# Patient Record
Sex: MALE | Race: WHITE | NOT HISPANIC OR LATINO | URBAN - METROPOLITAN AREA
[De-identification: names, ages, dates, MRNs, and addresses within clinical notes are randomized per-mention and may not be internally consistent; named-entity substitution may affect disease eponyms.]

---

## 2020-06-24 ENCOUNTER — APPOINTMENT (RX ONLY)
Dept: URBAN - METROPOLITAN AREA OTHER 9 | Facility: OTHER | Age: 60
Setting detail: DERMATOLOGY
End: 2020-06-24

## 2020-06-24 DIAGNOSIS — L91.8 OTHER HYPERTROPHIC DISORDERS OF THE SKIN: ICD-10-CM

## 2020-06-24 DIAGNOSIS — L82.1 OTHER SEBORRHEIC KERATOSIS: ICD-10-CM

## 2020-06-24 DIAGNOSIS — D22 MELANOCYTIC NEVI: ICD-10-CM

## 2020-06-24 DIAGNOSIS — L81.4 OTHER MELANIN HYPERPIGMENTATION: ICD-10-CM

## 2020-06-24 DIAGNOSIS — L44.8 OTHER SPECIFIED PAPULOSQUAMOUS DISORDERS: ICD-10-CM

## 2020-06-24 PROBLEM — D22.4 MELANOCYTIC NEVI OF SCALP AND NECK: Status: ACTIVE | Noted: 2020-06-24

## 2020-06-24 PROCEDURE — ? OTHER

## 2020-06-24 PROCEDURE — ? COUNSELING

## 2020-06-24 PROCEDURE — 99202 OFFICE O/P NEW SF 15 MIN: CPT

## 2020-06-24 ASSESSMENT — LOCATION DETAILED DESCRIPTION DERM
LOCATION DETAILED: RIGHT INFERIOR FOREHEAD
LOCATION DETAILED: RIGHT AXILLARY VAULT
LOCATION DETAILED: RIGHT CENTRAL TEMPLE
LOCATION DETAILED: LEFT FOREHEAD
LOCATION DETAILED: INFERIOR THORACIC SPINE
LOCATION DETAILED: RIGHT MEDIAL MALAR CHEEK
LOCATION DETAILED: RIGHT SUPERIOR CENTRAL MALAR CHEEK
LOCATION DETAILED: LEFT INFERIOR UPPER BACK
LOCATION DETAILED: NASAL DORSUM
LOCATION DETAILED: RIGHT LATERAL SUPERIOR CHEST
LOCATION DETAILED: RIGHT CENTRAL LATERAL NECK
LOCATION DETAILED: RIGHT SUPERIOR LATERAL MALAR CHEEK

## 2020-06-24 ASSESSMENT — LOCATION SIMPLE DESCRIPTION DERM
LOCATION SIMPLE: NECK
LOCATION SIMPLE: RIGHT TEMPLE
LOCATION SIMPLE: RIGHT FOREHEAD
LOCATION SIMPLE: UPPER BACK
LOCATION SIMPLE: LEFT FOREHEAD
LOCATION SIMPLE: LEFT UPPER BACK
LOCATION SIMPLE: RIGHT AXILLARY VAULT
LOCATION SIMPLE: RIGHT CHEEK
LOCATION SIMPLE: CHEST
LOCATION SIMPLE: NOSE

## 2020-06-24 ASSESSMENT — LOCATION ZONE DERM
LOCATION ZONE: AXILLAE
LOCATION ZONE: FACE
LOCATION ZONE: NECK
LOCATION ZONE: TRUNK
LOCATION ZONE: NOSE

## 2020-06-24 NOTE — PROCEDURE: OTHER
Note Text (......Xxx Chief Complaint.): This diagnosis correlates with the
Detail Level: Detailed
Other (Free Text): Patient informed that SKs can be treated with LN2 if patient desires but is not necessary as they’re benign growth. Patient declined LN2 treatment today.

## 2021-06-29 ENCOUNTER — APPOINTMENT (RX ONLY)
Dept: URBAN - METROPOLITAN AREA OTHER 9 | Facility: OTHER | Age: 61
Setting detail: DERMATOLOGY
End: 2021-06-29

## 2021-06-29 DIAGNOSIS — L91.8 OTHER HYPERTROPHIC DISORDERS OF THE SKIN: ICD-10-CM

## 2021-06-29 DIAGNOSIS — L82.1 OTHER SEBORRHEIC KERATOSIS: ICD-10-CM

## 2021-06-29 DIAGNOSIS — L81.4 OTHER MELANIN HYPERPIGMENTATION: ICD-10-CM

## 2021-06-29 DIAGNOSIS — L90.8 OTHER ATROPHIC DISORDERS OF SKIN: ICD-10-CM

## 2021-06-29 DIAGNOSIS — L57.8 OTHER SKIN CHANGES DUE TO CHRONIC EXPOSURE TO NONIONIZING RADIATION: ICD-10-CM

## 2021-06-29 DIAGNOSIS — D22 MELANOCYTIC NEVI: ICD-10-CM

## 2021-06-29 DIAGNOSIS — L44.8 OTHER SPECIFIED PAPULOSQUAMOUS DISORDERS: ICD-10-CM

## 2021-06-29 DIAGNOSIS — Z71.89 OTHER SPECIFIED COUNSELING: ICD-10-CM

## 2021-06-29 DIAGNOSIS — D18.0 HEMANGIOMA: ICD-10-CM

## 2021-06-29 PROBLEM — D22.5 MELANOCYTIC NEVI OF TRUNK: Status: ACTIVE | Noted: 2021-06-29

## 2021-06-29 PROBLEM — D18.01 HEMANGIOMA OF SKIN AND SUBCUTANEOUS TISSUE: Status: ACTIVE | Noted: 2021-06-29

## 2021-06-29 PROBLEM — D22.39 MELANOCYTIC NEVI OF OTHER PARTS OF FACE: Status: ACTIVE | Noted: 2021-06-29

## 2021-06-29 PROCEDURE — ? DEFER

## 2021-06-29 PROCEDURE — ? OTHER

## 2021-06-29 PROCEDURE — ? COUNSELING

## 2021-06-29 PROCEDURE — 99213 OFFICE O/P EST LOW 20 MIN: CPT

## 2021-06-29 ASSESSMENT — LOCATION DETAILED DESCRIPTION DERM
LOCATION DETAILED: LEFT SUPERIOR CENTRAL MALAR CHEEK
LOCATION DETAILED: STERNUM
LOCATION DETAILED: RIGHT INFERIOR MEDIAL UPPER BACK
LOCATION DETAILED: RIGHT SUPERIOR CENTRAL MALAR CHEEK
LOCATION DETAILED: RIGHT AXILLARY VAULT
LOCATION DETAILED: RIGHT MEDIAL MALAR CHEEK
LOCATION DETAILED: LEFT AXILLARY VAULT
LOCATION DETAILED: LEFT INFERIOR CENTRAL MALAR CHEEK
LOCATION DETAILED: RIGHT CENTRAL FRONTAL SCALP
LOCATION DETAILED: INFERIOR THORACIC SPINE
LOCATION DETAILED: RIGHT SUPERIOR MEDIAL FOREHEAD
LOCATION DETAILED: RIGHT CENTRAL FRONTAL SCALP
LOCATION DETAILED: RIGHT MEDIAL SUPERIOR CHEST
LOCATION DETAILED: PERIUMBILICAL SKIN

## 2021-06-29 ASSESSMENT — LOCATION SIMPLE DESCRIPTION DERM
LOCATION SIMPLE: RIGHT SCALP
LOCATION SIMPLE: RIGHT SCALP
LOCATION SIMPLE: RIGHT AXILLARY VAULT
LOCATION SIMPLE: RIGHT UPPER BACK
LOCATION SIMPLE: CHEST
LOCATION SIMPLE: RIGHT CHEEK
LOCATION SIMPLE: LEFT AXILLARY VAULT
LOCATION SIMPLE: UPPER BACK
LOCATION SIMPLE: LEFT CHEEK
LOCATION SIMPLE: ABDOMEN
LOCATION SIMPLE: RIGHT FOREHEAD

## 2021-06-29 ASSESSMENT — LOCATION ZONE DERM
LOCATION ZONE: AXILLAE
LOCATION ZONE: SCALP
LOCATION ZONE: SCALP
LOCATION ZONE: FACE
LOCATION ZONE: TRUNK

## 2021-06-29 NOTE — PROCEDURE: OTHER
Other (Free Text): Referred patient to  for consultation.
Detail Level: Detailed
Render Risk Assessment In Note?: no
Note Text (......Xxx Chief Complaint.): This diagnosis correlates with the

## 2022-01-07 ENCOUNTER — OFFICE VISIT (OUTPATIENT)
Dept: URBAN - METROPOLITAN AREA CLINIC 42 | Facility: CLINIC | Age: 62
End: 2022-01-07

## 2022-04-19 ENCOUNTER — OFFICE VISIT (OUTPATIENT)
Dept: URBAN - METROPOLITAN AREA TELEHEALTH 2 | Facility: TELEHEALTH | Age: 62
End: 2022-04-19

## 2022-04-23 ENCOUNTER — TELEPHONE ENCOUNTER (OUTPATIENT)
Dept: URBAN - METROPOLITAN AREA CLINIC 92 | Facility: CLINIC | Age: 62
End: 2022-04-23

## 2022-04-23 NOTE — HPI-TODAY'S VISIT:
Patient is a 61-year-old male being referred for evaluation of hepatitis B issues. A copy of the note will be sent to the referring provider. Patient previously had seen Dr. Baez here back in 2016. Recently had been seen by Dr. Ozzie Scott of digestive [facility]. Please see a note here from November 6, 2018 that mentions that the patient has chronic hep B and postprandial belching and bloating.  Ultrasound ordered for liver screening was not done.  EGD and colon was also not done. Patient apparently had issues getting the ultrasound done at [facility]. He was reported to have low viral load and liver enzymes that were negative and E antibody positive state.  They stressed the importance of screening. Weight at that time was 165 pounds height of 5 foot 10 BMI 23.68.  December 7, 2016 labs show white blood cell count 7.5 hemoglobin 14.2 glucose 104 BUN of 16 creatinine 1.18 albumin 4.5 AST 26 ALT 15 alkaline phosphatase 55 bilirubin 0.6. July 22, 2015 colonoscopy showed 4 polyps in the right colon ranging from 3 to 5 mm removed with hot snare and 2 polyps in the transverse colon ranging from 2 to 3 mm removed with cold snare and sigmoid diverticulosis and internal hemorrhoids. Pathology from the right colon polypectomy show fragments of tubular adenoma and fragments of hyperplastic polyp and submucosal lipoma transverse colon polyp was hyperplastic and tubular adenoma. May 2016 B surface antigen positive E antibody positive viral load 8 await. January 2017 labs showed AFP elevated 15.3 viral load 480 AST 31. March 2017 ultrasound mentions pancreas poorly seen no definite abnormality liver was remarkable for a mildly complicated 1.5 x 1.1 x 1.2 cm cyst in the inferior aspect and no gallstones. October 2017 MRI showed small cyst in the posterior segment the right liver otherwise unremarkable. They recommended the patient to have tumor screening and that he could potentially do alternating ultrasound with MRI. They wanted to see him in 6 months. Do not see any notes from that timeframe but do see notes from earlier in April 2018 where again he was being seen for the hepatitis B and labs from September 2017 showed AST 27 ALT 16 total bili 0.8 in May 2016 labs reference viral load 8 await. December 2016 labs show white count 7.5 hemoglobin 14.2 creatinine 1.0 AST 31 ALT 21 Past medical history chronic hep B, colon polyps, back pain. Past surgeries include colonoscopy 2015, nasal septal surgery, fractured left wrist. Social history not a smoker but occasional social alcohol. Father with prostate cancer and kidney stones.  Mother with thyroid disease. We did see a hep B panel (from November 6, 2018 a level of 300 hep B April 2018 for 50 September 2017 230 ALT over the same timeframe have been 16, 15, and 16. We did confirm that January 9, 2017 AFP that was elevated at 15.3 and normal was less than 8.3. We saw an EGD / colon from October 2019 showed a small hiatal hernia mild gastritis and a 5 mm ascending colon polyp and diverticulosis.  Duodenum showed no significant histologic changes stomach showed no H. pylori and ascending colon showed vegetable matter. Assessment: 1.	Chronic hepatitis B not felt to be meeting criteria previously has less than 2000 and ALT and less then 35 and ALT but with elevated AFP seen in the past.  Needs to have updated labs done to check on this as well as updated imaging.  Stressed the importance of every 6-month surveillance at a minimum and more often if AFP is elevated.  Patient needs to get labs as well as imaging and may need to do more advanced imaging pending above. 2.	History of liver cyst noted and needs follow-up imaging.  Overall important to maintain imaging for hepatitis  people can go on to cirrhosis or cancer but in addition he had an elevated alpha-fetoprotein. 3.	Elevated AFP noted and needs to be followed up on.  Unclear if related to inflammation or other issues and needs to be confirmed if elevated should be on at least an every 4-month imaging protocol.  May need MRI pending ultrasound. 4.	History of colon polyps noted in last colonoscopy 2019.  Previously saw Dr. Ojeda for same here and Dr Scott locally. 5.	Elevated tumor markers noted and needs to be followed up on.. Plan 1.  _update labs.   2.  _update AFP 3.  _update imaging. 4.  Reassess status.   Stressed to pt the need for social distancing and strict handwashing and wearing a mask and to follow any other new or added CDC recommendations as this is an evolving target. Duration of the visit was 0 minutes with 20 minutes of chart prep and 20 minutes by dox video and then by phone due to internet issues by phone for this TeleMed visit with time reviewing their prior and recent records and labs and discussing their current status and future plans for care for the patient.

## 2022-04-25 ENCOUNTER — LAB OUTSIDE AN ENCOUNTER (OUTPATIENT)
Dept: URBAN - METROPOLITAN AREA CLINIC 86 | Facility: CLINIC | Age: 62
End: 2022-04-25

## 2022-04-25 ENCOUNTER — WEB ENCOUNTER (OUTPATIENT)
Dept: URBAN - METROPOLITAN AREA CLINIC 86 | Facility: CLINIC | Age: 62
End: 2022-04-25

## 2022-04-25 ENCOUNTER — OFFICE VISIT (OUTPATIENT)
Dept: URBAN - METROPOLITAN AREA CLINIC 86 | Facility: CLINIC | Age: 62
End: 2022-04-25
Payer: COMMERCIAL

## 2022-04-25 VITALS
BODY MASS INDEX: 22.4 KG/M2 | DIASTOLIC BLOOD PRESSURE: 70 MMHG | SYSTOLIC BLOOD PRESSURE: 105 MMHG | HEART RATE: 68 BPM | TEMPERATURE: 97.9 F | HEIGHT: 71 IN | WEIGHT: 160 LBS

## 2022-04-25 DIAGNOSIS — R77.2 ELEVATED AFP: ICD-10-CM

## 2022-04-25 DIAGNOSIS — K44.9 HIATAL HERNIA: ICD-10-CM

## 2022-04-25 DIAGNOSIS — K76.89 LIVER CYST: ICD-10-CM

## 2022-04-25 DIAGNOSIS — B18.1 CHRONIC HEPATITIS B: ICD-10-CM

## 2022-04-25 DIAGNOSIS — K63.5 COLON POLYP: ICD-10-CM

## 2022-04-25 DIAGNOSIS — R89.8 ELEVATED TUMOR MARKERS: ICD-10-CM

## 2022-04-25 PROBLEM — 84089009 HIATAL HERNIA: Status: ACTIVE | Noted: 2022-04-23

## 2022-04-25 PROBLEM — 68496003 POLYP COLON: Status: ACTIVE | Noted: 2022-04-23

## 2022-04-25 PROCEDURE — 99204 OFFICE O/P NEW MOD 45 MIN: CPT

## 2022-04-25 PROCEDURE — 99244 OFF/OP CNSLTJ NEW/EST MOD 40: CPT

## 2022-04-25 NOTE — EXAM-PHYSICAL EXAM
Gen: awake and responsive. Eyes: anicteric, normal lids. Mouth: covered with mask. Nose: covered with mask. Hearing: intact grossly. Neck: trachea midline and no jvd. CV: RRR no s3. Lungs: clear. No wheezes, Abd: Soft, nabs, Nr, NT. No hsm. Ext: no sig edema, some  palm erythema. Neuro: moves all 4 ext grossly. No asterixis. Skin: no pruritis and some palm erythema.

## 2022-04-25 NOTE — HPI-TODAY'S VISIT:
Patient is a 61-year-old male being referred for evaluation of hepatitis B issues.  A copy of the note will be sent to the referring provider.  Records review: Patient previously had seen Dr. Baez here back in 2016. Not much of that info.  Recently had been seen by Dr. Ozzie Scott of [facility].  Please see a note here from November 6, 2018 that mentions that the patient has chronic hep B and postprandial belching and bloating.  Ultrasound ordered for liver screening was not done.  EGD and colon was also not done.  Patient apparently had issues getting the ultrasound done at [facility].  He was reported to have low viral load and liver enzymes that were negative and E antibody positive state.    They stressed the importance of screening.  Same 165 still.  Weight at that time was 165 pounds height of 5 foot 10 BMI 23.68.    December 7, 2016 labs show white blood cell count 7.5 hemoglobin 14.2 glucose 104 BUN of 16 creatinine 1.18 albumin 4.5 AST 26 ALT 15 alkaline phosphatase 55 bilirubin 0.6.  July 22, 2015 colonoscopy showed 4 polyps in the right colon ranging from 3 to 5 mm removed with hot snare and 2 polyps in the transverse colon ranging from 2 to 3 mm removed with cold snare and sigmoid diverticulosis and internal hemorrhoids.  Last colon 2019 and only one polyp then.  Pathology from the right colon polypectomy show fragments of tubular adenoma and fragments of hyperplastic polyp and submucosal lipoma transverse colon polyp was hyperplastic and tubular adenoma.  May 2016 B surface antigen positive E antibody positive viral load 8 await.  January 2017 labs showed AFP elevated 15.3 viral load 480 AST 31.  March 2017 ultrasound mentions pancreas poorly seen no definite abnormality liver was remarkable for a mildly complicated 1.5 x 1.1 x 1.2 cm cyst in the inferior aspect and no gallstones.  October 2017 MRI showed small cyst in the posterior segment the right liver otherwise unremarkable. They recommended the patient to have tumor screening and that he could potentially do alternating ultrasound with MRI.  They wanted to see him in 6 months.  He has not done any more since then.  Pt says he went to donate blood and he is from [place].  He says had letter then. He saw specialist then and he was 17 and told not to worry.  He was seeing Dr Baez and Dr Scott for gi issues and then mentioned this and led to the work up.  Do not see any notes from that timeframe but do see notes from earlier in April 2018 where again he was being seen for the hepatitis B and labs from September 2017 showed AST 27 ALT 16 total bili 0.8 in May 2016 labs reference viral load 8 await.  December 2016 labs show white count 7.5 hemoglobin 14.2 creatinine 1.0 AST 31 ALT 21  Past medical history chronic hep B, colon polyps, back pain.  Past surgeries include colonoscopy 2015, nasal septal surgery, fractured left wrist.  Social history not a smoker but occasional social alcohol.  Father with prostate cancer and kidney stones.  Mother with thyroid disease.  We did see a hep B panel (from November 6, 2018 a level of 300 hep B April 2018 for 50 September 2017 230 ALT over the same timeframe have been 16, 15, and 16.  We did confirm that January 9, 2017 AFP that was elevated at 15.3 and normal was less than 8.3.  We saw an EGD / colon from October 2019 showed a small hiatal hernia mild gastritis and a 5 mm ascending colon polyp and diverticulosis.  Duodenum showed no significant histologic changes stomach showed no H. pylori and ascending colon showed vegetable matter.  _update: If afp up needing to be done more often.  Need to caught on labs and u.s. Plan 1.  _update labs.   2.  _update AFP 3.  _update imaging. 4.  Reassess status. 5. RTC in 6m.  Stressed to pt the need for social distancing and strict handwashing and wearing a mask and to follow any other new or added CDC recommendations as this is an evolving target.  Duration of the visit was 50 minutes with 30 minutes of chart prep and 20 minutes for this face to face visit today with time reviewing their prior and recent records and labs and discussing their current status and future plans for care for the patient.

## 2022-04-30 ENCOUNTER — TELEPHONE ENCOUNTER (OUTPATIENT)
Dept: URBAN - METROPOLITAN AREA CLINIC 92 | Facility: CLINIC | Age: 62
End: 2022-04-30

## 2022-04-30 LAB
A/G RATIO: 1.9
AFP, SERUM, TUMOR MARKER: 10.9
ALBUMIN: 4.8
ALKALINE PHOSPHATASE: 55
ALT (SGPT): 17
AST (SGOT): 30
BASO (ABSOLUTE): 0.1
BASOS: 1
BILIRUBIN, TOTAL: 0.8
BUN/CREATININE RATIO: 13
BUN: 14
CALCIUM: 9.4
CARBON DIOXIDE, TOTAL: 23
CHLORIDE: 101
CREATININE: 1.1
EGFR: 77
EOS (ABSOLUTE): 0.1
EOS: 2
GLOBULIN, TOTAL: 2.5
GLUCOSE: 93
HBV LOG10: 1.6
HEMATOCRIT: 42.1
HEMATOLOGY COMMENTS:: (no result)
HEMOGLOBIN: 13.9
HEP A AB, TOTAL: NEGATIVE
HEP BE AB: POSITIVE
HEP BE AG: NEGATIVE
HEPATITIS B QUANTITATION: 40
IMMATURE CELLS: (no result)
IMMATURE GRANS (ABS): 0
IMMATURE GRANULOCYTES: 0
LYMPHS (ABSOLUTE): 2.1
LYMPHS: 35
MCH: 28.7
MCHC: 33
MCV: 87
MONOCYTES(ABSOLUTE): 0.5
MONOCYTES: 8
NEUTROPHILS (ABSOLUTE): 3.2
NEUTROPHILS: 54
NRBC: (no result)
PLATELETS: 284
POTASSIUM: 4.3
PROTEIN, TOTAL: 7.3
RBC: 4.84
RDW: 13
SODIUM: 139
TEST INFORMATION:: (no result)
WBC: 6

## 2022-04-30 NOTE — HPI-TODAY'S VISIT:
Dear Johan White, April 25 labs show hepatitis A immunity is not seen and you should consider getting the hepatitis A vaccine series.  It is a series of 2 doses and can prevent you against getting hepatitis A on top of your current chronic hepatitis issues which would have more risk to cause issues in you versus if you did not have the chronic hepatitis. AFP remains elevated slightly at 10.9 normal being less than 8.4. Hepatitis B E antigen is negative hepatitis B E antibody is positive.  This puts you in a lower replicative state.  Very common to see at your age. Hepatitis B DNA is low at 40.  By itself not meeting criteria for treatment as hbv B dna is less than 2000. Glucose 93 BUN of 14 creatinine 1.10 sodium 139 potassium 4.3 calcium 9.4 albumin 4.8 bilirubin 0.8 alkaline phosphatase 55 AST 30 and ALT 17.  Ideal ALT is less than 35. White blood cell count 6.0 hemoglobin 13.9 platelet count 284 MCV 87 and neutrophils 3.2 lymphocytes 2.1. I would normally recommend with these labs to do the ultrasound imaging and blood work every 4 months and to do a more advanced scan if the ultrasound has any issues noted.  I am still awaiting the ultrasound imaging. Dr. Sen

## 2022-05-03 ENCOUNTER — OFFICE VISIT (OUTPATIENT)
Dept: URBAN - METROPOLITAN AREA CLINIC 91 | Facility: CLINIC | Age: 62
End: 2022-05-03
Payer: COMMERCIAL

## 2022-05-03 DIAGNOSIS — K76.89 HEPATIC CYST: ICD-10-CM

## 2022-05-03 DIAGNOSIS — B19.10 HEP B W/O COMA: ICD-10-CM

## 2022-05-03 PROCEDURE — 93975 VASCULAR STUDY: CPT

## 2022-05-03 PROCEDURE — 76705 ECHO EXAM OF ABDOMEN: CPT

## 2022-05-04 ENCOUNTER — TELEPHONE ENCOUNTER (OUTPATIENT)
Dept: URBAN - METROPOLITAN AREA CLINIC 92 | Facility: CLINIC | Age: 62
End: 2022-05-04

## 2022-05-04 NOTE — HPI-TODAY'S VISIT:
Dear Johan White, May 3 ultrasound shows the right upper quadrant study to show no gallstones in the gallbladder. Common bile duct normal at 4 mm. Liver however appeared to be coarsened in regards to the echotexture to them. A 9 mm right lobe liver cyst was seen. The pancreas was not well seen due to gas. The right kidney measured 11.1 cm. Spleen measured 10.1 cm. Liver vessels were patent. They were concerned about the liver coarsening as to possible underlying fibrosis and/or cirrhosis risk. Typically we would recommend to do an MRI to assess this further. I looked back at notes regarding prior studies for you and when I looked at the last ultrasound in 2017 it did not mention any coarsening but it did mention the 1.5 x 1.1 x 1.2 cm cyst of the liver. The last October 2017 MRI mentioned a cyst but supposedly the liver was otherwise unremarkable. As you recall from your recent lab review hep B DNA remains low at 40 and the AST 30 and ALT of 17 and the platelet count normal. I tried to call you to discuss the results but could not get you. I will send this via portal to you and if you agree to do the mri we will order it to be done now to be sure. We look forward to hearing back from you. Dr Sen

## 2022-05-05 ENCOUNTER — WEB ENCOUNTER (OUTPATIENT)
Dept: URBAN - METROPOLITAN AREA CLINIC 92 | Facility: CLINIC | Age: 62
End: 2022-05-05

## 2022-05-06 ENCOUNTER — LAB OUTSIDE AN ENCOUNTER (OUTPATIENT)
Dept: URBAN - METROPOLITAN AREA CLINIC 92 | Facility: CLINIC | Age: 62
End: 2022-05-06

## 2022-05-20 ENCOUNTER — WEB ENCOUNTER (OUTPATIENT)
Dept: URBAN - METROPOLITAN AREA CLINIC 92 | Facility: CLINIC | Age: 62
End: 2022-05-20

## 2022-06-06 ENCOUNTER — WEB ENCOUNTER (OUTPATIENT)
Dept: URBAN - METROPOLITAN AREA CLINIC 92 | Facility: CLINIC | Age: 62
End: 2022-06-06

## 2022-06-08 ENCOUNTER — WEB ENCOUNTER (OUTPATIENT)
Dept: URBAN - METROPOLITAN AREA CLINIC 92 | Facility: CLINIC | Age: 62
End: 2022-06-08

## 2022-06-08 ENCOUNTER — TELEPHONE ENCOUNTER (OUTPATIENT)
Dept: URBAN - METROPOLITAN AREA CLINIC 92 | Facility: CLINIC | Age: 62
End: 2022-06-08

## 2022-06-08 NOTE — HPI-TODAY'S VISIT:
Dear Johan White, My office staff was able to get the May 27 AHI report sent in. There was some motion limitations on the scan. Lower chest visualized area showed no significant hilar effusion at the lung bases. The liver was within normal limits for size with the right lobe measuring 16.5 cm. No significant liver fat was seen. There are a few small T2 hyperintense and T1 hypointense hepatic cyst seen measuring 1.4 cm in the posterior right lobe and 0.8 cm near the gallbladder fossa. Several other scattered additional subcentimeter T2 hyperintense foci likely hepatic cysts and/or biliary hamartomas were seen throughout the liver. No suspicious enhancing liver lesions were seen. Liver fat was normal at 5%. The gallbladder was unremarkable. No intra or extrahepatic bile duct dilation was seen. Spleen was normal with regards to size. Adrenal glands were unremarkable. Pancreas was unremarkable. Kidneys showed no hydronephrosis but there is a 1.5 cm cyst seen in the left kidney. Liver vessels were patent. They did see scattered colon diverticuli in the visualized colon but no signs of diverticulitis. Overall they felt that you had no significant liver fat but they did see multiple small hepatic cysts and/or biliary hamartomas.  No suspicious liver lesions were seen. ideally we would redo an u.s in 6m and you prior requested a 1 yr follow up. Please advise if you would like to do the u.s in 6m and be seen then as well. Please call [employer] at [phone] ext [phone]. Thank you. Dr. Sen

## 2022-06-30 ENCOUNTER — APPOINTMENT (RX ONLY)
Dept: URBAN - METROPOLITAN AREA OTHER 9 | Facility: OTHER | Age: 62
Setting detail: DERMATOLOGY
End: 2022-06-30

## 2022-06-30 DIAGNOSIS — D22 MELANOCYTIC NEVI: ICD-10-CM

## 2022-06-30 DIAGNOSIS — L82.1 OTHER SEBORRHEIC KERATOSIS: ICD-10-CM

## 2022-06-30 DIAGNOSIS — D18.0 HEMANGIOMA: ICD-10-CM

## 2022-06-30 DIAGNOSIS — B37.2 CANDIDIASIS OF SKIN AND NAIL: ICD-10-CM

## 2022-06-30 DIAGNOSIS — L81.9 DISORDER OF PIGMENTATION, UNSPECIFIED: ICD-10-CM

## 2022-06-30 DIAGNOSIS — L44.8 OTHER SPECIFIED PAPULOSQUAMOUS DISORDERS: ICD-10-CM

## 2022-06-30 PROBLEM — D22.61 MELANOCYTIC NEVI OF RIGHT UPPER LIMB, INCLUDING SHOULDER: Status: ACTIVE | Noted: 2022-06-30

## 2022-06-30 PROBLEM — D18.01 HEMANGIOMA OF SKIN AND SUBCUTANEOUS TISSUE: Status: ACTIVE | Noted: 2022-06-30

## 2022-06-30 PROCEDURE — ? OTHER

## 2022-06-30 PROCEDURE — ? MEDICATION COUNSELING

## 2022-06-30 PROCEDURE — ? COUNSELING

## 2022-06-30 PROCEDURE — 99213 OFFICE O/P EST LOW 20 MIN: CPT

## 2022-06-30 PROCEDURE — ? PRESCRIPTION MEDICATION MANAGEMENT

## 2022-06-30 PROCEDURE — ? PRESCRIPTION

## 2022-06-30 RX ORDER — KETOCONAZOLE 20 MG/G
CREAM TOPICAL QD
Qty: 60 | Refills: 2 | Status: ERX | COMMUNITY
Start: 2022-06-30

## 2022-06-30 RX ORDER — KETOCONAZOLE 20 MG/ML
SHAMPOO, SUSPENSION TOPICAL
Qty: 120 | Refills: 5 | Status: ERX | COMMUNITY
Start: 2022-06-30

## 2022-06-30 RX ADMIN — KETOCONAZOLE: 20 SHAMPOO, SUSPENSION TOPICAL at 00:00

## 2022-06-30 RX ADMIN — KETOCONAZOLE: 20 CREAM TOPICAL at 00:00

## 2022-06-30 ASSESSMENT — LOCATION SIMPLE DESCRIPTION DERM
LOCATION SIMPLE: RIGHT UPPER BACK
LOCATION SIMPLE: LEFT FOREHEAD
LOCATION SIMPLE: LEFT PRETIBIAL REGION
LOCATION SIMPLE: LEFT UPPER BACK
LOCATION SIMPLE: RIGHT ELBOW
LOCATION SIMPLE: LEFT FOREHEAD
LOCATION SIMPLE: ABDOMEN
LOCATION SIMPLE: LEFT THIGH

## 2022-06-30 ASSESSMENT — LOCATION ZONE DERM
LOCATION ZONE: TRUNK
LOCATION ZONE: ARM
LOCATION ZONE: FACE
LOCATION ZONE: LEG
LOCATION ZONE: FACE

## 2022-06-30 ASSESSMENT — LOCATION DETAILED DESCRIPTION DERM
LOCATION DETAILED: LEFT PROXIMAL PRETIBIAL REGION
LOCATION DETAILED: LEFT SUPERIOR FOREHEAD
LOCATION DETAILED: LEFT MID-UPPER BACK
LOCATION DETAILED: EPIGASTRIC SKIN
LOCATION DETAILED: LEFT SUPERIOR FOREHEAD
LOCATION DETAILED: RIGHT LATERAL UPPER BACK
LOCATION DETAILED: RIGHT ANTECUBITAL SKIN
LOCATION DETAILED: LEFT ANTERIOR PROXIMAL THIGH

## 2022-06-30 NOTE — PROCEDURE: PRESCRIPTION MEDICATION MANAGEMENT
Detail Level: Zone
Initiate Treatment: Ketoconazole cream\\nKetoconazole shampoo
Plan: Apply to affected area QD
Render In Strict Bullet Format?: No

## 2022-06-30 NOTE — PROCEDURE: MEDICATION COUNSELING
Solaraze Counseling:  I discussed with the patient the risks of Solaraze including but not limited to erythema, scaling, itching, weeping, crusting, and pain.
Imiquimod Counseling:  I discussed with the patient the risks of imiquimod including but not limited to erythema, scaling, itching, weeping, crusting, and pain.  Patient understands that the inflammatory response to imiquimod is variable from person to person and was educated regarded proper titration schedule.  If flu-like symptoms develop, patient knows to discontinue the medication and contact us.
Dupixent Counseling: I discussed with the patient the risks of dupilumab including but not limited to eye infection and irritation, cold sores, injection site reactions, worsening of asthma, allergic reactions and increased risk of parasitic infection.  Live vaccines should be avoided while taking dupilumab. Dupilumab will also interact with certain medications such as warfarin and cyclosporine. The patient understands that monitoring is required and they must alert us or the primary physician if symptoms of infection or other concerning signs are noted.
Tetracycline Counseling: Patient counseled regarding possible photosensitivity and increased risk for sunburn.  Patient instructed to avoid sunlight, if possible.  When exposed to sunlight, patients should wear protective clothing, sunglasses, and sunscreen.  The patient was instructed to call the office immediately if the following severe adverse effects occur:  hearing changes, easy bruising/bleeding, severe headache, or vision changes.  The patient verbalized understanding of the proper use and possible adverse effects of tetracycline.  All of the patient's questions and concerns were addressed. Patient understands to avoid pregnancy while on therapy due to potential birth defects.
Doxepin Pregnancy And Lactation Text: This medication is Pregnancy Category C and it isn't known if it is safe during pregnancy. It is also excreted in breast milk and breast feeding isn't recommended.
Gabapentin Counseling: I discussed with the patient the risks of gabapentin including but not limited to dizziness, somnolence, fatigue and ataxia.
Simponi Counseling:  I discussed with the patient the risks of golimumab including but not limited to myelosuppression, immunosuppression, autoimmune hepatitis, demyelinating diseases, lymphoma, and serious infections.  The patient understands that monitoring is required including a PPD at baseline and must alert us or the primary physician if symptoms of infection or other concerning signs are noted.
Benzoyl Peroxide Counseling: Patient counseled that medicine may cause skin irritation and bleach clothing.  In the event of skin irritation, the patient was advised to reduce the amount of the drug applied or use it less frequently.   The patient verbalized understanding of the proper use and possible adverse effects of benzoyl peroxide.  All of the patient's questions and concerns were addressed.
Cellcept Pregnancy And Lactation Text: This medication is Pregnancy Category D and isn't considered safe during pregnancy. It is unknown if this medication is excreted in breast milk.
Cephalexin Counseling: I counseled the patient regarding use of cephalexin as an antibiotic for prophylactic and/or therapeutic purposes. Cephalexin (commonly prescribed under brand name Keflex) is a cephalosporin antibiotic which is active against numerous classes of bacteria, including most skin bacteria. Side effects may include nausea, diarrhea, gastrointestinal upset, rash, hives, yeast infections, and in rare cases, hepatitis, kidney disease, seizures, fever, confusion, neurologic symptoms, and others. Patients with severe allergies to penicillin medications are cautioned that there is about a 10% incidence of cross-reactivity with cephalosporins. When possible, patients with penicillin allergies should use alternatives to cephalosporins for antibiotic therapy.
Dupixent Pregnancy And Lactation Text: This medication likely crosses the placenta but the risk for the fetus is uncertain. This medication is excreted in breast milk.
Solaraze Pregnancy And Lactation Text: This medication is Pregnancy Category B and is considered safe. There is some data to suggest avoiding during the third trimester. It is unknown if this medication is excreted in breast milk.
Hydroxyzine Pregnancy And Lactation Text: This medication is not safe during pregnancy and should not be taken. It is also excreted in breast milk and breast feeding isn't recommended.
Imiquimod Pregnancy And Lactation Text: This medication is Pregnancy Category C. It is unknown if this medication is excreted in breast milk.
Glycopyrrolate Counseling:  I discussed with the patient the risks of glycopyrrolate including but not limited to skin rash, drowsiness, dry mouth, difficulty urinating, and blurred vision.
Propranolol Counseling:  I discussed with the patient the risks of propranolol including but not limited to low heart rate, low blood pressure, low blood sugar, restlessness and increased cold sensitivity. They should call the office if they experience any of these side effects.
Gabapentin Pregnancy And Lactation Text: This medication is Pregnancy Category C and isn't considered safe during pregnancy. It is excreted in breast milk.
Simponi Pregnancy And Lactation Text: The risk during pregnancy and breastfeeding is uncertain with this medication.
Hydroxyzine Counseling: Patient advised that the medication is sedating and not to drive a car after taking this medication.  Patient informed of potential adverse effects including but not limited to dry mouth, urinary retention, and blurry vision.  The patient verbalized understanding of the proper use and possible adverse effects of hydroxyzine.  All of the patient's questions and concerns were addressed.
Oxybutynin Pregnancy And Lactation Text: This medication is Pregnancy Category B and is considered safe during pregnancy. It is unknown if it is excreted in breast milk.
Tetracycline Pregnancy And Lactation Text: This medication is Pregnancy Category D and not consider safe during pregnancy. It is also excreted in breast milk.
Cephalexin Pregnancy And Lactation Text: This medication is Pregnancy Category B and considered safe during pregnancy.  It is also excreted in breast milk but can be used safely for shorter doses.
Opioid Counseling: I discussed with the patient the potential side effects of opioids including but not limited to addiction, altered mental status, and depression. I stressed avoiding alcohol, benzodiazepines, muscle relaxants and sleep aids unless specifically okayed by a physician. The patient verbalized understanding of the proper use and possible adverse effects of opioids. All of the patient's questions and concerns were addressed. They were instructed to flush the remaining pills down the toilet if they did not need them for pain.
Cyclophosphamide Counseling:  I discussed with the patient the risks of cyclophosphamide including but not limited to hair loss, hormonal abnormalities, decreased fertility, abdominal pain, diarrhea, nausea and vomiting, bone marrow suppression and infection. The patient understands that monitoring is required while taking this medication.
Benzoyl Peroxide Pregnancy And Lactation Text: This medication is Pregnancy Category C. It is unknown if benzoyl peroxide is excreted in breast milk.
Klisyri Counseling:  I discussed with the patient the risks of Klisyri including but not limited to erythema, scaling, itching, weeping, crusting, and pain.
Carac Counseling:  I discussed with the patient the risks of Carac including but not limited to erythema, scaling, itching, weeping, crusting, and pain.
Propranolol Pregnancy And Lactation Text: This medication is Pregnancy Category C and it isn't known if it is safe during pregnancy. It is excreted in breast milk.
Cyclosporine Counseling:  I discussed with the patient the risks of cyclosporine including but not limited to hypertension, gingival hyperplasia,myelosuppression, immunosuppression, liver damage, kidney damage, neurotoxicity, lymphoma, and serious infections. The patient understands that monitoring is required including baseline blood pressure, CBC, CMP, lipid panel and uric acid, and then 1-2 times monthly CMP and blood pressure.
Clindamycin Pregnancy And Lactation Text: This medication can be used in pregnancy if certain situations. Clindamycin is also present in breast milk.
Clindamycin Counseling: I counseled the patient regarding use of clindamycin as an antibiotic for prophylactic and/or therapeutic purposes. Clindamycin is active against numerous classes of bacteria, including skin bacteria. Side effects may include nausea, diarrhea, gastrointestinal upset, rash, hives, yeast infections, and in rare cases, colitis.
Cyclophosphamide Pregnancy And Lactation Text: This medication is Pregnancy Category D and it isn't considered safe during pregnancy. This medication is excreted in breast milk.
Topical Retinoid counseling:  Patient advised to apply a pea-sized amount only at bedtime and wait 30 minutes after washing their face before applying.  If too drying, patient may add a non-comedogenic moisturizer. The patient verbalized understanding of the proper use and possible adverse effects of retinoids.  All of the patient's questions and concerns were addressed.
Opioid Pregnancy And Lactation Text: These medications can lead to premature delivery and should be avoided during pregnancy. These medications are also present in breast milk in small amounts.
Enbrel Counseling:  I discussed with the patient the risks of etanercept including but not limited to myelosuppression, immunosuppression, autoimmune hepatitis, demyelinating diseases, lymphoma, and infections.  The patient understands that monitoring is required including a PPD at baseline and must alert us or the primary physician if symptoms of infection or other concerning signs are noted.
Skyrizi Counseling: I discussed with the patient the risks of risankizumab-rzaa including but not limited to immunosuppression, and serious infections.  The patient understands that monitoring is required including a PPD at baseline and must alert us or the primary physician if symptoms of infection or other concerning signs are noted.
Birth Control Pills Counseling: Birth Control Pill Counseling: I discussed with the patient the potential side effects of OCPs including but not limited to increased risk of stroke, heart attack, thrombophlebitis, deep venous thrombosis, hepatic adenomas, breast changes, GI upset, headaches, and depression.  The patient verbalized understanding of the proper use and possible adverse effects of OCPs. All of the patient's questions and concerns were addressed.
Albendazole Counseling:  I discussed with the patient the risks of albendazole including but not limited to cytopenia, kidney damage, nausea/vomiting and severe allergy.  The patient understands that this medication is being used in an off-label manner.
Klisyri Pregnancy And Lactation Text: It is unknown if this medication can harm a developing fetus or if it is excreted in breast milk.
Fluconazole Counseling:  Patient counseled regarding adverse effects of fluconazole including but not limited to headache, diarrhea, nausea, upset stomach, liver function test abnormalities, taste disturbance, and stomach pain.  There is a rare possibility of liver failure that can occur when taking fluconazole.  The patient understands that monitoring of LFTs and kidney function test may be required, especially at baseline. The patient verbalized understanding of the proper use and possible adverse effects of fluconazole.  All of the patient's questions and concerns were addressed.
Carac Pregnancy And Lactation Text: This medication is Pregnancy Category X and contraindicated in pregnancy and in women who may become pregnant. It is unknown if this medication is excreted in breast milk.
Arava Counseling:  Patient counseled regarding adverse effects of Arava including but not limited to nausea, vomiting, abnormalities in liver function tests. Patients may develop mouth sores, rash, diarrhea, and abnormalities in blood counts. The patient understands that monitoring is required including LFTs and blood counts.  There is a rare possibility of scarring of the liver and lung problems that can occur when taking methotrexate. Persistent nausea, loss of appetite, pale stools, dark urine, cough, and shortness of breath should be reported immediately. Patient advised to discontinue Arava treatment and consult with a physician prior to attempting conception. The patient will have to undergo a treatment to eliminate Arava from the body prior to conception.
Hydroxychloroquine Counseling:  I discussed with the patient that a baseline ophthalmologic exam is needed at the start of therapy and every year thereafter while on therapy. A CBC may also be warranted for monitoring.  The side effects of this medication were discussed with the patient, including but not limited to agranulocytosis, aplastic anemia, seizures, rashes, retinopathy, and liver toxicity. Patient instructed to call the office should any adverse effect occur.  The patient verbalized understanding of the proper use and possible adverse effects of Plaquenil.  All the patient's questions and concerns were addressed.
Cyclosporine Pregnancy And Lactation Text: This medication is Pregnancy Category C and it isn't know if it is safe during pregnancy. This medication is excreted in breast milk.
Glycopyrrolate Pregnancy And Lactation Text: This medication is Pregnancy Category B and is considered safe during pregnancy. It is unknown if it is excreted breast milk.
Enbrel Pregnancy And Lactation Text: This medication is Pregnancy Category B and is considered safe during pregnancy. It is unknown if this medication is excreted in breast milk.
Arava Pregnancy And Lactation Text: This medication is Pregnancy Category X and is absolutely contraindicated during pregnancy. It is unknown if it is excreted in breast milk.
Fluconazole Pregnancy And Lactation Text: This medication is Pregnancy Category C and it isn't know if it is safe during pregnancy. It is also excreted in breast milk.
Hydroxychloroquine Pregnancy And Lactation Text: This medication has been shown to cause fetal harm but it isn't assigned a Pregnancy Risk Category. There are small amounts excreted in breast milk.
Doxycycline Pregnancy And Lactation Text: This medication is Pregnancy Category D and not consider safe during pregnancy. It is also excreted in breast milk but is considered safe for shorter treatment courses.
Minoxidil Counseling: Minoxidil is a topical medication which can increase blood flow where it is applied. It is uncertain how this medication increases hair growth. Side effects are uncommon and include stinging and allergic reactions.
Calcipotriene Counseling: Cantharidin Counseling:  I discussed with the patient the risks of Cantharidin including but not limited to pain, redness, burning, itching, and blistering.
Tazorac Pregnancy And Lactation Text: This medication is not safe during pregnancy. It is unknown if this medication is excreted in breast milk.
Minoxidil Pregnancy And Lactation Text: This medication has not been assigned a Pregnancy Risk Category but animal studies failed to show danger with the topical medication. It is unknown if the medication is excreted in breast milk.
Tazorac Counseling:  Patient advised that medication is irritating and drying.  Patient may need to apply sparingly and wash off after an hour before eventually leaving it on overnight.  The patient verbalized understanding of the proper use and possible adverse effects of tazorac.  All of the patient's questions and concerns were addressed.
Doxycycline Counseling:  Patient counseled regarding possible photosensitivity and increased risk for sunburn.  Patient instructed to avoid sunlight, if possible.  When exposed to sunlight, patients should wear protective clothing, sunglasses, and sunscreen.  The patient was instructed to call the office immediately if the following severe adverse effects occur:  hearing changes, easy bruising/bleeding, severe headache, or vision changes.  The patient verbalized understanding of the proper use and possible adverse effects of doxycycline.  All of the patient's questions and concerns were addressed.
Stelara Counseling:  I discussed with the patient the risks of ustekinumab including but not limited to immunosuppression, malignancy, posterior leukoencephalopathy syndrome, and serious infections.  The patient understands that monitoring is required including a PPD at baseline and must alert us or the primary physician if symptoms of infection or other concerning signs are noted.
Albendazole Pregnancy And Lactation Text: This medication is Pregnancy Category C and it isn't known if it is safe during pregnancy. It is also excreted in breast milk.
Humira Counseling:  I discussed with the patient the risks of adalimumab including but not limited to myelosuppression, immunosuppression, autoimmune hepatitis, demyelinating diseases, lymphoma, and serious infections.  The patient understands that monitoring is required including a PPD at baseline and must alert us or the primary physician if symptoms of infection or other concerning signs are noted.
Birth Control Pills Pregnancy And Lactation Text: This medication should be avoided if pregnant and for the first 30 days post-partum.
Libtayo Counseling- I discussed with the patient the risks of Libtayo including but not limited to nausea, vomiting, diarrhea, and bone or muscle pain.  The patient verbalized understanding of the proper use and possible adverse effects of Libtayo.  All of the patient's questions and concerns were addressed.
Calcipotriene Pregnancy And Lactation Text: This medication has not been proven safe during pregnancy. It is unknown if this medication is excreted in breast milk.
Topical Clindamycin Counseling: Patient counseled that this medication may cause skin irritation or allergic reactions.  In the event of skin irritation, the patient was advised to reduce the amount of the drug applied or use it less frequently.   The patient verbalized understanding of the proper use and possible adverse effects of clindamycin.  All of the patient's questions and concerns were addressed.
Erythromycin Counseling:  I discussed with the patient the risks of erythromycin including but not limited to GI upset, allergic reaction, drug rash, diarrhea, increase in liver enzymes, and yeast infections.
Taltz Counseling: I discussed with the patient the risks of ixekizumab including but not limited to immunosuppression, serious infections, worsening of inflammatory bowel disease and drug reactions.  The patient understands that monitoring is required including a PPD at baseline and must alert us or the primary physician if symptoms of infection or other concerning signs are noted.
Clofazimine Counseling:  I discussed with the patient the risks of clofazimine including but not limited to skin and eye pigmentation, liver damage, nausea/vomiting, gastrointestinal bleeding and allergy.
Methotrexate Pregnancy And Lactation Text: This medication is Pregnancy Category X and is known to cause fetal harm. This medication is excreted in breast milk.
Methotrexate Counseling:  Patient counseled regarding adverse effects of methotrexate including but not limited to nausea, vomiting, abnormalities in liver function tests. Patients may develop mouth sores, rash, diarrhea, and abnormalities in blood counts. The patient understands that monitoring is required including LFT's and blood counts.  There is a rare possibility of scarring of the liver and lung problems that can occur when taking methotrexate. Persistent nausea, loss of appetite, pale stools, dark urine, cough, and shortness of breath should be reported immediately. Patient advised to discontinue methotrexate treatment at least three months before attempting to become pregnant.  I discussed the need for folate supplements while taking methotrexate.  These supplements can decrease side effects during methotrexate treatment. The patient verbalized understanding of the proper use and possible adverse effects of methotrexate.  All of the patient's questions and concerns were addressed.
Ivermectin Counseling:  Patient instructed to take medication on an empty stomach with a full glass of water.  Patient informed of potential adverse effects including but not limited to nausea, diarrhea, dizziness, itching, and swelling of the extremities or lymph nodes.  The patient verbalized understanding of the proper use and possible adverse effects of ivermectin.  All of the patient's questions and concerns were addressed.
Mirvaso Counseling: Mirvaso is a topical medication which can decrease superficial blood flow where applied. Side effects are uncommon and include stinging, redness and allergic reactions.
Spironolactone Counseling: Patient advised regarding risks of diarrhea, abdominal pain, hyperkalemia, birth defects (for female patients), liver toxicity and renal toxicity. The patient may need blood work to monitor liver and kidney function and potassium levels while on therapy. The patient verbalized understanding of the proper use and possible adverse effects of spironolactone.  All of the patient's questions and concerns were addressed.
Griseofulvin Counseling:  I discussed with the patient the risks of griseofulvin including but not limited to photosensitivity, cytopenia, liver damage, nausea/vomiting and severe allergy.  The patient understands that this medication is best absorbed when taken with a fatty meal (e.g., ice cream or french fries).
Erythromycin Pregnancy And Lactation Text: This medication is Pregnancy Category B and is considered safe during pregnancy. It is also excreted in breast milk.
Prednisone Counseling:  I discussed with the patient the risks of prolonged use of prednisone including but not limited to weight gain, insomnia, osteoporosis, mood changes, diabetes, susceptibility to infection, glaucoma and high blood pressure.  In cases where prednisone use is prolonged, patients should be monitored with blood pressure checks, serum glucose levels and an eye exam.  Additionally, the patient may need to be placed on GI prophylaxis, PCP prophylaxis, and calcium and vitamin D supplementation and/or a bisphosphonate.  The patient verbalized understanding of the proper use and the possible adverse effects of prednisone.  All of the patient's questions and concerns were addressed.
SSKI Counseling:  I discussed with the patient the risks of SSKI including but not limited to thyroid abnormalities, metallic taste, GI upset, fever, headache, acne, arthralgias, paraesthesias, lymphadenopathy, easy bleeding, arrhythmias, and allergic reaction.
Cantharidin Counseling: Calcipotriene Counseling:  I discussed with the patient the risks of calcipotriene including but not limited to erythema, scaling, itching, and irritation.
Ilumya Counseling: I discussed with the patient the risks of tildrakizumab including but not limited to immunosuppression, malignancy, posterior leukoencephalopathy syndrome, and serious infections.  The patient understands that monitoring is required including a PPD at baseline and must alert us or the primary physician if symptoms of infection or other concerning signs are noted.
Spironolactone Pregnancy And Lactation Text: This medication can cause feminization of the male fetus and should be avoided during pregnancy. The active metabolite is also found in breast milk.
Cantharidin Pregnancy And Lactation Text: The use of this medication during pregnancy or lactation is not recommended as there is insufficient data.
Griseofulvin Pregnancy And Lactation Text: This medication is Pregnancy Category X and is known to cause serious birth defects. It is unknown if this medication is excreted in breast milk but breast feeding should be avoided.
Mirvaso Pregnancy And Lactation Text: This medication has not been assigned a Pregnancy Risk Category. It is unknown if the medication is excreted in breast milk.
Libtayo Pregnancy And Lactation Text: This medication is contraindicated in pregnancy and when breast feeding.
Colchicine Counseling:  Patient counseled regarding adverse effects including but not limited to stomach upset (nausea, vomiting, stomach pain, or diarrhea).  Patient instructed to limit alcohol consumption while taking this medication.  Colchicine may reduce blood counts especially with prolonged use.  The patient understands that monitoring of kidney function and blood counts may be required, especially at baseline. The patient verbalized understanding of the proper use and possible adverse effects of colchicine.  All of the patient's questions and concerns were addressed.
Opzelura Counseling:  I discussed with the patient the risks of Opzelura including but not limited to nasopharngitis, bronchitis, ear infection, eosinophila, hives, diarrhea, folliculitis, tonsillitis, and rhinorrhea.  Taken orally, this medication has been linked to serious infections; higher rate of mortality; malignancy and lymphoproliferative disorders; major adverse cardiovascular events; thrombosis; thrombocytopenia, anemia, and neutropenia; and lipid elevations.
Infliximab Counseling:  I discussed with the patient the risks of infliximab including but not limited to myelosuppression, immunosuppression, autoimmune hepatitis, demyelinating diseases, lymphoma, and serious infections.  The patient understands that monitoring is required including a PPD at baseline and must alert us or the primary physician if symptoms of infection or other concerning signs are noted.
Sski Pregnancy And Lactation Text: This medication is Pregnancy Category D and isn't considered safe during pregnancy. It is excreted in breast milk.
Detail Level: Simple
Niacinamide Counseling: I recommended taking niacin or niacinamide, also know as vitamin B3, twice daily. Recent evidence suggests that taking vitamin B3 (500 mg twice daily) can reduce the risk of actinic keratoses and non-melanoma skin cancers. Side effects of vitamin B3 include flushing and headache.
Itraconazole Counseling:  I discussed with the patient the risks of itraconazole including but not limited to liver damage, nausea/vomiting, neuropathy, and severe allergy.  The patient understands that this medication is best absorbed when taken with acidic beverages such as non-diet cola or ginger ale.  The patient understands that monitoring is required including baseline LFTs and repeat LFTs at intervals.  The patient understands that they are to contact us or the primary physician if concerning signs are noted.
Tremfya Counseling: I discussed with the patient the risks of guselkumab including but not limited to immunosuppression, serious infections, and drug reactions.  The patient understands that monitoring is required including a PPD at baseline and must alert us or the primary physician if symptoms of infection or other concerning signs are noted.
5-Fu Counseling: 5-Fluorouracil Counseling:  I discussed with the patient the risks of 5-fluorouracil including but not limited to erythema, scaling, itching, weeping, crusting, and pain.
Metronidazole Counseling:  I discussed with the patient the risks of metronidazole including but not limited to seizures, nausea/vomiting, a metallic taste in the mouth, nausea/vomiting and severe allergy.
Topical Ketoconazole Counseling: Patient counseled that this medication may cause skin irritation or allergic reactions.  In the event of skin irritation, the patient was advised to reduce the amount of the drug applied or use it less frequently.   The patient verbalized understanding of the proper use and possible adverse effects of ketoconazole.  All of the patient's questions and concerns were addressed.
Nsaids Counseling: NSAID Counseling: I discussed with the patient that NSAIDs should be taken with food. Prolonged use of NSAIDs can result in the development of stomach ulcers.  Patient advised to stop taking NSAIDs if abdominal pain occurs.  The patient verbalized understanding of the proper use and possible adverse effects of NSAIDs.  All of the patient's questions and concerns were addressed.
Bexarotene Pregnancy And Lactation Text: This medication is Pregnancy Category X and should not be given to women who are pregnant or may become pregnant. This medication should not be used if you are breast feeding.
Thalidomide Counseling: I discussed with the patient the risks of thalidomide including but not limited to birth defects, anxiety, weakness, chest pain, dizziness, cough and severe allergy.
Niacinamide Pregnancy And Lactation Text: These medications are considered safe during pregnancy.
Isotretinoin Counseling: Patient should get monthly blood tests, not donate blood, not drive at night if vision affected, not share medication, and not undergo elective surgery for 6 months after tx completed. Side effects reviewed, pt to contact office should one occur.
Metronidazole Pregnancy And Lactation Text: This medication is Pregnancy Category B and considered safe during pregnancy.  It is also excreted in breast milk.
Opzelura Pregnancy And Lactation Text: There is insufficient data to evaluate drug-associated risk for major birth defects, miscarriage, or other adverse maternal or fetal outcomes.  There is a pregnancy registry that monitors pregnancy outcomes in pregnant persons exposed to the medication during pregnancy.  It is unknown if this medication is excreted in breast milk.  Do not breastfeed during treatment and for about 4 weeks after the last dose.
Picato Counseling:  I discussed with the patient the risks of Picato including but not limited to erythema, scaling, itching, weeping, crusting, and pain.
Drysol Counseling:  I discussed with the patient the risks of drysol/aluminum chloride including but not limited to skin rash, itching, irritation, burning.
Adbry Counseling: I discussed with the patient the risks of tralokinumab including but not limited to eye infection and irritation, cold sores, injection site reactions, worsening of asthma, allergic reactions and increased risk of parasitic infection.  Live vaccines should be avoided while taking tralokinumab. The patient understands that monitoring is required and they must alert us or the primary physician if symptoms of infection or other concerning signs are noted.
Acitretin Pregnancy And Lactation Text: This medication is Pregnancy Category X and should not be given to women who are pregnant or may become pregnant in the future. This medication is excreted in breast milk.
Acitretin Counseling:  I discussed with the patient the risks of acitretin including but not limited to hair loss, dry lips/skin/eyes, liver damage, hyperlipidemia, depression/suicidal ideation, photosensitivity.  Serious rare side effects can include but are not limited to pancreatitis, pseudotumor cerebri, bony changes, clot formation/stroke/heart attack.  Patient understands that alcohol is contraindicated since it can result in liver toxicity and significantly prolong the elimination of the drug by many years.
Minocycline Counseling: Patient advised regarding possible photosensitivity and discoloration of the teeth, skin, lips, tongue and gums.  Patient instructed to avoid sunlight, if possible.  When exposed to sunlight, patients should wear protective clothing, sunglasses, and sunscreen.  The patient was instructed to call the office immediately if the following severe adverse effects occur:  hearing changes, easy bruising/bleeding, severe headache, or vision changes.  The patient verbalized understanding of the proper use and possible adverse effects of minocycline.  All of the patient's questions and concerns were addressed.
Topical Sulfur Applications Counseling: Topical Sulfur Counseling: Patient counseled that this medication may cause skin irritation or allergic reactions.  In the event of skin irritation, the patient was advised to reduce the amount of the drug applied or use it less frequently.   The patient verbalized understanding of the proper use and possible adverse effects of topical sulfur application.  All of the patient's questions and concerns were addressed.
Dapsone Counseling: I discussed with the patient the risks of dapsone including but not limited to hemolytic anemia, agranulocytosis, rashes, methemoglobinemia, kidney failure, peripheral neuropathy, headaches, GI upset, and liver toxicity.  Patients who start dapsone require monitoring including baseline LFTs and weekly CBCs for the first month, then every month thereafter.  The patient verbalized understanding of the proper use and possible adverse effects of dapsone.  All of the patient's questions and concerns were addressed.
Ketoconazole Counseling:   Patient counseled regarding improving absorption with orange juice.  Adverse effects include but are not limited to breast enlargement, headache, diarrhea, nausea, upset stomach, liver function test abnormalities, taste disturbance, and stomach pain.  There is a rare possibility of liver failure that can occur when taking ketoconazole. The patient understands that monitoring of LFTs may be required, especially at baseline. The patient verbalized understanding of the proper use and possible adverse effects of ketoconazole.  All of the patient's questions and concerns were addressed.
Olumiant Counseling: I discussed with the patient the risks of Olumiant therapy including but not limited to upper respiratory tract infections, shingles, cold sores, and nausea. Live vaccines should be avoided.  This medication has been linked to serious infections; higher rate of mortality; malignancy and lymphoproliferative disorders; major adverse cardiovascular events; thrombosis; gastrointestinal perforations; neutropenia; lymphopenia; anemia; liver enzyme elevations; and lipid elevations.
Xeljanz Counseling: I discussed with the patient the risks of Xeljanz therapy including increased risk of infection, liver issues, headache, diarrhea, or cold symptoms. Live vaccines should be avoided. They were instructed to call if they have any problems.
Isotretinoin Pregnancy And Lactation Text: This medication is Pregnancy Category X and is considered extremely dangerous during pregnancy. It is unknown if it is excreted in breast milk.
Include Pregnancy/Lactation Warning?: No
Adbry Pregnancy And Lactation Text: It is unknown if this medication will adversely affect pregnancy or breast feeding.
Tranexamic Acid Counseling:  Patient advised of the small risk of bleeding problems with tranexamic acid. They were also instructed to call if they developed any nausea, vomiting or diarrhea. All of the patient's questions and concerns were addressed.
Drysol Pregnancy And Lactation Text: This medication is considered safe during pregnancy and breast feeding.
Terbinafine Counseling: Patient counseling regarding adverse effects of terbinafine including but not limited to headache, diarrhea, rash, upset stomach, liver function test abnormalities, itching, taste/smell disturbance, nausea, abdominal pain, and flatulence.  There is a rare possibility of liver failure that can occur when taking terbinafine.  The patient understands that a baseline LFT and kidney function test may be required. The patient verbalized understanding of the proper use and possible adverse effects of terbinafine.  All of the patient's questions and concerns were addressed.
Dutasteride Male Counseling: Dustasteride Counseling:  I discussed with the patient the risks of use of dutasteride including but not limited to decreased libido, decreased ejaculate volume, and gynecomastia. Women who can become pregnant should not handle medication.  All of the patient's questions and concerns were addressed.
Bexarotene Counseling:  I discussed with the patient the risks of bexarotene including but not limited to hair loss, dry lips/skin/eyes, liver abnormalities, hyperlipidemia, pancreatitis, depression/suicidal ideation, photosensitivity, drug rash/allergic reactions, hypothyroidism, anemia, leukopenia, infection, cataracts, and teratogenicity.  Patient understands that they will need regular blood tests to check lipid profile, liver function tests, white blood cell count, thyroid function tests and pregnancy test if applicable.
Odomzo Counseling- I discussed with the patient the risks of Odomzo including but not limited to nausea, vomiting, diarrhea, constipation, weight loss, changes in the sense of taste, decreased appetite, muscle spasms, and hair loss.  The patient verbalized understanding of the proper use and possible adverse effects of Odomzo.  All of the patient's questions and concerns were addressed.
Dapsone Pregnancy And Lactation Text: This medication is Pregnancy Category C and is not considered safe during pregnancy or breast feeding.
Olumiant Pregnancy And Lactation Text: Based on animal studies, Olumiant may cause embryo-fetal harm when administered to pregnant women.  The medication should not be used in pregnancy.  Breastfeeding is not recommended during treatment.
Nsaids Pregnancy And Lactation Text: These medications are considered safe up to 30 weeks gestation. It is excreted in breast milk.
Ketoconazole Pregnancy And Lactation Text: This medication is Pregnancy Category C and it isn't know if it is safe during pregnancy. It is also excreted in breast milk and breast feeding isn't recommended.
Topical Sulfur Applications Pregnancy And Lactation Text: This medication is considered safe during pregnancy and breast feeding secondary to limited systemic absorption.
Xeldkz Pregnancy And Lactation Text: This medication is Pregnancy Category D and is not considered safe during pregnancy.  The risk during breast feeding is also uncertain.
High Dose Vitamin A Counseling: Side effects reviewed, pt to contact office should one occur.
Elidel Counseling: Patient may experience a mild burning sensation during topical application. Elidel is not approved in children less than 2 years of age. There have been case reports of hematologic and skin malignancies in patients using topical calcineurin inhibitors although causality is questionable.
High Dose Vitamin A Pregnancy And Lactation Text: High dose vitamin A therapy is contraindicated during pregnancy and breast feeding.
Terbinafine Pregnancy And Lactation Text: This medication is Pregnancy Category B and is considered safe during pregnancy. It is also excreted in breast milk and breast feeding isn't recommended.
Dutasteride Pregnancy And Lactation Text: This medication is absolutely contraindicated in women, especially during pregnancy and breast feeding. Feminization of male fetuses is possible if taking while pregnant.
Xolair Pregnancy And Lactation Text: This medication is Pregnancy Category B and is considered safe during pregnancy. This medication is excreted in breast milk.
Protopic Counseling: Patient may experience a mild burning sensation during topical application. Protopic is not approved in children less than 2 years of age. There have been case reports of hematologic and skin malignancies in patients using topical calcineurin inhibitors although causality is questionable.
Protopic Pregnancy And Lactation Text: This medication is Pregnancy Category C. It is unknown if this medication is excreted in breast milk when applied topically.
Wartpeel Counseling:  I discussed with the patient the risks of Wartpeel including but not limited to erythema, scaling, itching, weeping, crusting, and pain.
Quinolones Counseling:  I discussed with the patient the risks of fluoroquinolones including but not limited to GI upset, allergic reaction, drug rash, diarrhea, dizziness, photosensitivity, yeast infections, liver function test abnormalities, tendonitis/tendon rupture.
Xolair Counseling:  Patient informed of potential adverse effects including but not limited to fever, muscle aches, rash and allergic reactions.  The patient verbalized understanding of the proper use and possible adverse effects of Xolair.  All of the patient's questions and concerns were addressed.
Cibinqo Counseling: I discussed with the patient the risks of Cibinqo therapy including but not limited to common cold, nausea, headache, cold sores, increased blood CPK levels, dizziness, UTIs, fatigue, acne, and vomitting. Live vaccines should be avoided.  This medication has been linked to serious infections; higher rate of mortality; malignancy and lymphoproliferative disorders; major adverse cardiovascular events; thrombosis; thrombocytopenia and lymphopenia; lipid elevations; and retinal detachment.
Rinvoq Counseling: I discussed with the patient the risks of Rinvoq therapy including but not limited to upper respiratory tract infections, shingles, cold sores, bronchitis, nausea, cough, fever, acne, and headache. Live vaccines should be avoided.  This medication has been linked to serious infections; higher rate of mortality; malignancy and lymphoproliferative disorders; major adverse cardiovascular events; thrombosis; thrombocytopenia, anemia, and neutropenia; lipid elevations; liver enzyme elevations; and gastrointestinal perforations.
Tranexamic Acid Pregnancy And Lactation Text: It is unknown if this medication is safe during pregnancy or breast feeding.
Oral Minoxidil Counseling- I discussed with the patient the risks of oral minoxidil including but not limited to shortness of breath, swelling of the feet or ankles, dizziness, lightheadedness, unwanted hair growth and allergic reaction.  The patient verbalized understanding of the proper use and possible adverse effects of oral minoxidil.  All of the patient's questions and concerns were addressed.
Winlevi Counseling:  I discussed with the patient the risks of topical clascoterone including but not limited to erythema, scaling, itching, and stinging. Patient voiced their understanding.
Rifampin Counseling: I discussed with the patient the risks of rifampin including but not limited to liver damage, kidney damage, red-orange body fluids, nausea/vomiting and severe allergy.
Erivedge Counseling- I discussed with the patient the risks of Erivedge including but not limited to nausea, vomiting, diarrhea, constipation, weight loss, changes in the sense of taste, decreased appetite, muscle spasms, and hair loss.  The patient verbalized understanding of the proper use and possible adverse effects of Erivedge.  All of the patient's questions and concerns were addressed.
Aklief counseling:  Patient advised to apply a pea-sized amount only at bedtime and wait 30 minutes after washing their face before applying.  If too drying, patient may add a non-comedogenic moisturizer.  The most commonly reported side effects including irritation, redness, scaling, dryness, stinging, burning, itching, and increased risk of sunburn.  The patient verbalized understanding of the proper use and possible adverse effects of retinoids.  All of the patient's questions and concerns were addressed.
Cibinqo Pregnancy And Lactation Text: It is unknown if this medication will adversely affect pregnancy or breast feeding.  You should not take this medication if you are currently pregnant or planning a pregnancy or while breastfeeding.
Qbrexza Counseling:  I discussed with the patient the risks of Qbrexza including but not limited to headache, mydriasis, blurred vision, dry eyes, nasal dryness, dry mouth, dry throat, dry skin, urinary hesitation, and constipation.  Local skin reactions including erythema, burning, stinging, and itching can also occur.
Valtrex Counseling: I discussed with the patient the risks of valacyclovir including but not limited to kidney damage, nausea, vomiting and severe allergy.  The patient understands that if the infection seems to be worsening or is not improving, they are to call.
Rinvoq Pregnancy And Lactation Text: Based on animal studies, Rinvoq may cause embryo-fetal harm when administered to pregnant women.  The medication should not be used in pregnancy.  Breastfeeding is not recommended during treatment and for 6 days after the last dose.
Winlevi Pregnancy And Lactation Text: This medication is considered safe during pregnancy and breastfeeding.
Rifampin Pregnancy And Lactation Text: This medication is Pregnancy Category C and it isn't know if it is safe during pregnancy. It is also excreted in breast milk and should not be used if you are breast feeding.
Azathioprine Counseling:  I discussed with the patient the risks of azathioprine including but not limited to myelosuppression, immunosuppression, hepatotoxicity, lymphoma, and infections.  The patient understands that monitoring is required including baseline LFTs, Creatinine, possible TPMP genotyping and weekly CBCs for the first month and then every 2 weeks thereafter.  The patient verbalized understanding of the proper use and possible adverse effects of azathioprine.  All of the patient's questions and concerns were addressed.
Rituxan Pregnancy And Lactation Text: This medication is Pregnancy Category C and it isn't know if it is safe during pregnancy. It is unknown if this medication is excreted in breast milk but similar antibodies are known to be excreted.
Azithromycin Pregnancy And Lactation Text: This medication is considered safe during pregnancy and is also secreted in breast milk.
Eucrisa Counseling: Patient may experience a mild burning sensation during topical application. Eucrisa is not approved in children less than 3 months of age.
Qbrexza Pregnancy And Lactation Text: There is no available data on Qbrexza use in pregnant women.  There is no available data on Qbrexza use in lactation.
Azithromycin Counseling:  I discussed with the patient the risks of azithromycin including but not limited to GI upset, allergic reaction, drug rash, diarrhea, and yeast infections.
Valtrex Pregnancy And Lactation Text: this medication is Pregnancy Category B and is considered safe during pregnancy. This medication is not directly found in breast milk but it's metabolite acyclovir is present.
Cimzia Counseling:  I discussed with the patient the risks of Cimzia including but not limited to immunosuppression, allergic reactions and infections.  The patient understands that monitoring is required including a PPD at baseline and must alert us or the primary physician if symptoms of infection or other concerning signs are noted.
Rituxan Counseling:  I discussed with the patient the risks of Rituxan infusions. Side effects can include infusion reactions, severe drug rashes including mucocutaneous reactions, reactivation of latent hepatitis and other infections and rarely progressive multifocal leukoencephalopathy.  All of the patient's questions and concerns were addressed.
Cimetidine Counseling:  I discussed with the patient the risks of Cimetidine including but not limited to gynecomastia, headache, diarrhea, nausea, drowsiness, arrhythmias, pancreatitis, skin rashes, psychosis, bone marrow suppression and kidney toxicity.
Oral Minoxidil Pregnancy And Lactation Text: This medication should only be used when clearly needed if you are pregnant, attempting to become pregnant or breast feeding.
Finasteride Male Counseling: Finasteride Counseling:  I discussed with the patient the risks of use of finasteride including but not limited to decreased libido, decreased ejaculate volume, gynecomastia, and depression. Women should not handle medication.  All of the patient's questions and concerns were addressed.
Siliq Counseling:  I discussed with the patient the risks of Siliq including but not limited to new or worsening depression, suicidal thoughts and behavior, immunosuppression, malignancy, posterior leukoencephalopathy syndrome, and serious infections.  The patient understands that monitoring is required including a PPD at baseline and must alert us or the primary physician if symptoms of infection or other concerning signs are noted. There is also a special program designed to monitor depression which is required with [place].
Aklief Pregnancy And Lactation Text: It is unknown if this medication is safe to use during pregnancy.  It is unknown if this medication is excreted in breast milk.  Breastfeeding women should use the topical cream on the smallest area of the skin for the shortest time needed while breastfeeding.  Do not apply to nipple and areola.
Bactrim Counseling:  I discussed with the patient the risks of sulfa antibiotics including but not limited to GI upset, allergic reaction, drug rash, diarrhea, dizziness, photosensitivity, and yeast infections.  Rarely, more serious reactions can occur including but not limited to aplastic anemia, agranulocytosis, methemoglobinemia, blood dyscrasias, liver or kidney failure, lung infiltrates or desquamative/blistering drug rashes.
Doxepin Counseling:  Patient advised that the medication is sedating and not to drive a car after taking this medication. Patient informed of potential adverse effects including but not limited to dry mouth, urinary retention, and blurry vision.  The patient verbalized understanding of the proper use and possible adverse effects of doxepin.  All of the patient's questions and concerns were addressed.
Cimzia Pregnancy And Lactation Text: This medication crosses the placenta but can be considered safe in certain situations. Cimzia may be excreted in breast milk.
Rhofade Counseling: Rhofade is a topical medication which can decrease superficial blood flow where applied. Side effects are uncommon and include stinging, redness and allergic reactions.
Hydroquinone Counseling:  Patient advised that medication may result in skin irritation, lightening (hypopigmentation), dryness, and burning.  In the event of skin irritation, the patient was advised to reduce the amount of the drug applied or use it less frequently.  Rarely, spots that are treated with hydroquinone can become darker (pseudoochronosis).  Should this occur, patient instructed to stop medication and call the office. The patient verbalized understanding of the proper use and possible adverse effects of hydroquinone.  All of the patient's questions and concerns were addressed.
Otezla Counseling: The side effects of Otezla were discussed with the patient, including but not limited to worsening or new depression, weight loss, diarrhea, nausea, upper respiratory tract infection, and headache. Patient instructed to call the office should any adverse effect occur.  The patient verbalized understanding of the proper use and possible adverse effects of Otezla.  All the patient's questions and concerns were addressed.
Sarecycline Counseling: Patient advised regarding possible photosensitivity and discoloration of the teeth, skin, lips, tongue and gums.  Patient instructed to avoid sunlight, if possible.  When exposed to sunlight, patients should wear protective clothing, sunglasses, and sunscreen.  The patient was instructed to call the office immediately if the following severe adverse effects occur:  hearing changes, easy bruising/bleeding, severe headache, or vision changes.  The patient verbalized understanding of the proper use and possible adverse effects of sarecycline.  All of the patient's questions and concerns were addressed.
Zyclara Counseling:  I discussed with the patient the risks of imiquimod including but not limited to erythema, scaling, itching, weeping, crusting, and pain.  Patient understands that the inflammatory response to imiquimod is variable from person to person and was educated regarded proper titration schedule.  If flu-like symptoms develop, patient knows to discontinue the medication and contact us.
Cellcept Counseling:  I discussed with the patient the risks of mycophenolate mofetil including but not limited to infection/immunosuppression, GI upset, hypokalemia, hypercholesterolemia, bone marrow suppression, lymphoproliferative disorders, malignancy, GI ulceration/bleed/perforation, colitis, interstitial lung disease, kidney failure, progressive multifocal leukoencephalopathy, and birth defects.  The patient understands that monitoring is required including a baseline creatinine and regular CBC testing. In addition, patient must alert us immediately if symptoms of infection or other concerning signs are noted.
Bactrim Pregnancy And Lactation Text: This medication is Pregnancy Category D and is known to cause fetal risk.  It is also excreted in breast milk.
Oxybutynin Counseling:  I discussed with the patient the risks of oxybutynin including but not limited to skin rash, drowsiness, dry mouth, difficulty urinating, and blurred vision.
Azelaic Acid Counseling: Patient counseled that medicine may cause skin irritation and to avoid applying near the eyes.  In the event of skin irritation, the patient was advised to reduce the amount of the drug applied or use it less frequently.   The patient verbalized understanding of the proper use and possible adverse effects of azelaic acid.  All of the patient's questions and concerns were addressed.
Cosentyx Counseling:  I discussed with the patient the risks of Cosentyx including but not limited to worsening of Crohn's disease, immunosuppression, allergic reactions and infections.  The patient understands that monitoring is required including a PPD at baseline and must alert us or the primary physician if symptoms of infection or other concerning signs are noted.
Otezla Pregnancy And Lactation Text: This medication is Pregnancy Category C and it isn't known if it is safe during pregnancy. It is unknown if it is excreted in breast milk.
Finasteride Pregnancy And Lactation Text: This medication is absolutely contraindicated during pregnancy. It is unknown if it is excreted in breast milk.

## 2022-06-30 NOTE — PROCEDURE: OTHER
Other (Free Text): Treated with LN2
Render Risk Assessment In Note?: no
Detail Level: Detailed
Note Text (......Xxx Chief Complaint.): This diagnosis correlates with the

## 2022-12-01 ENCOUNTER — LAB OUTSIDE AN ENCOUNTER (OUTPATIENT)
Dept: URBAN - METROPOLITAN AREA CLINIC 92 | Facility: CLINIC | Age: 62
End: 2022-12-01

## 2022-12-09 ENCOUNTER — OFFICE VISIT (OUTPATIENT)
Dept: URBAN - METROPOLITAN AREA TELEHEALTH 2 | Facility: TELEHEALTH | Age: 62
End: 2022-12-09
Payer: COMMERCIAL

## 2022-12-09 ENCOUNTER — LAB OUTSIDE AN ENCOUNTER (OUTPATIENT)
Dept: URBAN - METROPOLITAN AREA TELEHEALTH 2 | Facility: TELEHEALTH | Age: 62
End: 2022-12-09

## 2022-12-09 VITALS — HEIGHT: 71 IN | BODY MASS INDEX: 22.4 KG/M2 | WEIGHT: 160 LBS

## 2022-12-09 DIAGNOSIS — B18.1 CHRONIC HEPATITIS B: ICD-10-CM

## 2022-12-09 DIAGNOSIS — K76.89 LIVER CYST: ICD-10-CM

## 2022-12-09 DIAGNOSIS — R77.2 ELEVATED AFP: ICD-10-CM

## 2022-12-09 DIAGNOSIS — R89.8 ELEVATED TUMOR MARKERS: ICD-10-CM

## 2022-12-09 PROCEDURE — 99214 OFFICE O/P EST MOD 30 MIN: CPT

## 2022-12-09 NOTE — HPI-TODAY'S VISIT:
Patient is a 62-year-old male seen May 2022 and returning for evaluation of hepatitis B issues.  A copy of the note will be sent to the referring provider.  Asked re labs and no labs and imaging.  May 27 [facility] report sent in. There was some motion limitations on the scan. Lower chest visualized area showed no significant hilar effusion at the lung bases. The liver was within normal limits for size with the right lobe measuring 16.5 cm. No significant liver fat was seen. There are a few small T2 hyperintense and T1 hypointense hepatic cyst seen measuring 1.4 cm in the posterior right lobe and 0.8 cm near the gallbladder fossa. Several other scattered additional subcentimeter T2 hyperintense foci likely hepatic cysts and/or biliary hamartomas were seen throughout the liver. No suspicious enhancing liver lesions were seen. Liver fat was normal at 5%. The gallbladder was unremarkable. No intra or extrahepatic bile duct dilation was seen. Spleen was normal with regards to size. Adrenal glands were unremarkable. Pancreas was unremarkable. Kidneys showed no hydronephrosis but there is a 1.5 cm cyst seen in the left kidney. Liver vessels were patent. They did see scattered colon diverticuli in the visualized colon but no signs of diverticulitis. Overall they felt that you had no significant liver fat but they did see multiple small hepatic cysts and/or biliary hamartomas.  No suspicious liver lesions were seen. ideally we would redo an u.s in 6m and you prior requested a 1 yr follow up. Please advise if you would like to do the u.s in 6m and be seen then as well.  May 3 ultrasound shows the right upper quadrant study to show no gallstones in the gallbladder. Common bile duct normal at 4 mm. Liver however appeared to be coarsened in regards to the echotexture to them. A 9 mm right lobe liver cyst was seen. The pancreas was not well seen due to gas. The right kidney measured 11.1 cm. Spleen measured 10.1 cm. Liver vessels were patent. They were concerned about the liver coarsening as to possible underlying fibrosis and/or cirrhosis risk. Typically we would recommend to do an MRI to assess this further. I looked back at notes regarding prior studies for you and when I looked at the last ultrasound in 2017 it did not mention any coarsening but it did mention the 1.5 x 1.1 x 1.2 cm cyst of the liver. The last October 2017 MRI mentioned a cyst but supposedly the liver was otherwise unremarkable. As you recall from your recent lab review hep B DNA remains low at 40 and the AST 30 and ALT of 17 and the platelet count normal. I tried to call you to discuss the results but could not get you. I will send this via portal to you and if you agree to do the mri we will order it to be done now to be sure. We look forward to hearing back from you.  April 25 labs show hepatitis A immunity is not seen and you should consider getting the hepatitis A vaccine series.  It is a series of 2 doses and can prevent you against getting hepatitis A on top of your current chronic hepatitis issues which would have more risk to cause issues in you versus if you did not have the chronic hepatitis. AFP remains elevated slightly at 10.9 normal being less than 8.4. Hepatitis B E antigen is negative hepatitis B E antibody is positive.  This puts you in a lower replicative state.  Very common to see at your age. Hepatitis B DNA is low at 40.  By itself not meeting criteria for treatment as hbv B dna is less than 2000. Glucose 93 BUN of 14 creatinine 1.10 sodium 139 potassium 4.3 calcium 9.4 albumin 4.8 bilirubin 0.8 alkaline phosphatase 55 AST 30 and ALT 17.  Ideal ALT is less than 35. White blood cell count 6.0 hemoglobin 13.9 platelet count 284 MCV 87 and neutrophils 3.2 lymphocytes 2.1. I would normally recommend with these labs to do the ultrasound imaging and blood work every 4 months and to do a more advanced scan if the ultrasound has any issues noted.  I am still awaiting the ultrasound imaging.   Records review: Patient previously had seen Dr. Baez here back in 2016. Not much of that info.  Recently had been seen by Dr. Ozzie Scott of [facility].  Please see a note here from November 6, 2018 that mentions that the patient has chronic hep B and postprandial belching and bloating.  Ultrasound ordered for liver screening was not done.  EGD and colon was also not done.  Patient apparently had issues getting the ultrasound done at [facility].  He was reported to have low viral load and liver enzymes that were negative and E antibody positive state.    They stressed the importance of screening.  Same 165 still.  Weight at that time was 165 pounds height of 5 foot 10 BMI 23.68.    December 7, 2016 labs show white blood cell count 7.5 hemoglobin 14.2 glucose 104 BUN of 16 creatinine 1.18 albumin 4.5 AST 26 ALT 15 alkaline phosphatase 55 bilirubin 0.6.  July 22, 2015 colonoscopy showed 4 polyps in the right colon ranging from 3 to 5 mm removed with hot snare and 2 polyps in the transverse colon ranging from 2 to 3 mm removed with cold snare and sigmoid diverticulosis and internal hemorrhoids.  Last colon 2019 and only one polyp then.  Pathology from the right colon polypectomy show fragments of tubular adenoma and fragments of hyperplastic polyp and submucosal lipoma transverse colon polyp was hyperplastic and tubular adenoma.  May 2016 B surface antigen positive E antibody positive viral load 8 await.  January 2017 labs showed AFP elevated 15.3 viral load 480 AST 31.  March 2017 ultrasound mentions pancreas poorly seen no definite abnormality liver was remarkable for a mildly complicated 1.5 x 1.1 x 1.2 cm cyst in the inferior aspect and no gallstones.  October 2017 MRI showed small cyst in the posterior segment the right liver otherwise unremarkable. They recommended the patient to have tumor screening and that he could potentially do alternating ultrasound with MRI.  They wanted to see him in 6 months.  He has not done any more since then.  Pt says he went to donate blood and he is from [place].  He says had letter then. He saw specialist then and he was 17 and told not to worry.  He was seeing Dr Baez and Dr Scott for gi issues and then mentioned this and led to the work up.  Do not see any notes from that timeframe but do see notes from earlier in April 2018 where again he was being seen for the hepatitis B and labs from September 2017 showed AST 27 ALT 16 total bili 0.8 in May 2016 labs reference viral load 8 await.  December 2016 labs show white count 7.5 hemoglobin 14.2 creatinine 1.0 AST 31 ALT 21  Past medical history chronic hep B, colon polyps, back pain.  Past surgeries include colonoscopy 2015, nasal septal surgery, fractured left wrist.  Social history not a smoker but occasional social alcohol.  Father with prostate cancer and kidney stones.  Mother with thyroid disease.  We did see a hep B panel (from November 6, 2018 a level of 300 hep B April 2018 for 50 September 2017 230 ALT over the same timeframe have been 16, 15, and 16.  We did confirm that January 9, 2017 AFP that was elevated at 15.3 and normal was less than 8.3.  We saw an EGD / colon from October 2019 showed a small hiatal hernia mild gastritis and a 5 mm ascending colon polyp and diverticulosis.  Duodenum showed no significant histologic changes stomach showed no H. pylori and ascending colon showed vegetable matter.  _update: If afp up needing to be done more often.  Need to caught up on labs and u.s. Plan 1. We will get the labs done asap. 2.  We will get the u.s asap. 3.  Plan to see in 6m and do the same telemed. 4.  Needs to do the imaging  more pending.   Stressed to pt the need for social distancing and strict handwashing and wearing a mask and to follow any other new or added CDC recommendations as this is an evolving target.  Duration of the visit was  30 minutes with 10 minutes of chart prep and 20 minutes from 145 to 205 pm for this telemed healow  visit today with time reviewing their prior and recent records and labs and discussing their current status and future plans for care for the patient.

## 2022-12-16 ENCOUNTER — OFFICE VISIT (OUTPATIENT)
Dept: URBAN - METROPOLITAN AREA CLINIC 91 | Facility: CLINIC | Age: 62
End: 2022-12-16

## 2022-12-20 ENCOUNTER — TELEPHONE ENCOUNTER (OUTPATIENT)
Dept: URBAN - METROPOLITAN AREA CLINIC 86 | Facility: CLINIC | Age: 62
End: 2022-12-20

## 2022-12-20 LAB
A/G RATIO: 1.8
ABSOLUTE BASOPHILS: 38
ABSOLUTE EOSINOPHILS: 208
ABSOLUTE LYMPHOCYTES: 1777
ABSOLUTE MONOCYTES: 447
ABSOLUTE NEUTROPHILS: 3830
AFP, SERUM, TUMOR MARKER: 10.3
ALBUMIN: 4.4
ALKALINE PHOSPHATASE: 55
ALT (SGPT): 14
AST (SGOT): 23
BASOPHILS: 0.6
BILIRUBIN, TOTAL: 0.8
BUN/CREATININE RATIO: (no result)
BUN: 18
CALCIUM: 9.4
CARBON DIOXIDE, TOTAL: 30
CHLORIDE: 105
CREATININE: 1.14
EGFR: 73
EOSINOPHILS: 3.3
GLOBULIN, TOTAL: 2.5
GLUCOSE: 92
HEMATOCRIT: 39.3
HEMOGLOBIN: 13.6
HEPATITIS B SURFACE ANTIGEN: REACTIVE
HEPATITIS B VIRUS DNA: 121
HEPATITIS B VIRUS DNA: 2.08
LYMPHOCYTES: 28.2
MCH: 28.9
MCHC: 34.6
MCV: 83.6
MONOCYTES: 7.1
MPV: 10.1
NEUTROPHILS: 60.8
PLATELET COUNT: 224
POTASSIUM: 4.3
PROTEIN, TOTAL: 6.9
RDW: 12.7
RED BLOOD CELL COUNT: 4.7
SODIUM: 144
WHITE BLOOD CELL COUNT: 6.3

## 2022-12-20 NOTE — HPI-TODAY'S VISIT:
Melina White,  December 16th labs are back.  The complete blood count including the hemoglobin MCV, and platelets are normal.  The complete metabolic panel shows the creatinine is 1.14, sodium 144, potassium 4.3, bilirubin 0.8, alkaline phosphatase 55, AST 23, ALT 14 previously and in April the AST was 30 and the ALT was 17 so these are better.  The hepatitis B virus level is 121 so less than 2000.  Surface antigen is reactive.  The AFP was 10.3 previously in April it was 10.9.  We will see what the ultrasound shows us and as discussed we may need more advanced imaging.  Paulette Florez PA-C

## 2022-12-27 ENCOUNTER — OFFICE VISIT (OUTPATIENT)
Dept: URBAN - METROPOLITAN AREA CLINIC 91 | Facility: CLINIC | Age: 62
End: 2022-12-27
Payer: COMMERCIAL

## 2022-12-27 DIAGNOSIS — K76.89 LIVER CYST: ICD-10-CM

## 2022-12-27 DIAGNOSIS — B18.1 CHRONIC HEPATITIS B: ICD-10-CM

## 2022-12-27 PROCEDURE — 93975 VASCULAR STUDY: CPT

## 2022-12-27 PROCEDURE — 76705 ECHO EXAM OF ABDOMEN: CPT

## 2022-12-28 ENCOUNTER — TELEPHONE ENCOUNTER (OUTPATIENT)
Dept: URBAN - METROPOLITAN AREA CLINIC 92 | Facility: CLINIC | Age: 62
End: 2022-12-28

## 2022-12-28 ENCOUNTER — WEB ENCOUNTER (OUTPATIENT)
Dept: URBAN - METROPOLITAN AREA CLINIC 92 | Facility: CLINIC | Age: 62
End: 2022-12-28

## 2022-12-28 PROBLEM — 197321007 FATTY LIVER: Status: ACTIVE | Noted: 2022-12-28

## 2022-12-28 PROBLEM — 441988000 IMAGING OF ABDOMEN ABNORMAL: Status: ACTIVE | Noted: 2022-05-06

## 2022-12-28 NOTE — HPI-TODAY'S VISIT:
Dear Johan White, Dec 27 u.s shows pancreas not able to be seen due overlying bowel gas. Liver was normal in size and smooth in contour and mildly echogenic but without significant change versus May 2022 u.s.  They felt it represented a mild degree of fatty infiltration. They stated also that it remained mildly heterogeneous and without significant change. A 9.7 x 7.8 x 10.1 mm cyst was seen in the peripheral and posterior aspect of segment 7 and it was slightly larger but otherwise stable. No gallstones were seen in the gallbladder.  No bile duct dilation was seen.  Common hepatic bile duct was normal at 3.6 mm.  They could not see the more distal common bile duct due to gas as well. Right kidney was 10.6 cm. Spleen was 9.4 cm. Liver vessels were patent as expected. Overall they felt the liver still had mild fatty changes and the tiny cyst was slightly larger but was otherwise stable.  No gallstones were seen.  The spleen was normal sized at 9.4 cm. Given these findings and ecxam limits and the AFP remaining elevated at 10.3, we would recommend that you redo the ultrasound and labs again in the timeframe of April.  We can also change your telehealth appointment to that timeframe.  We will ask Eileen at [phone] ext [phone] to reach out to you to set this up. Dr Sen

## 2023-01-16 PROBLEM — 61977001 CHRONIC TYPE B VIRAL HEPATITIS: Status: ACTIVE | Noted: 2022-04-23

## 2023-01-16 PROBLEM — 166561008 ALPHA-FETOPROTEIN RAISED: Status: ACTIVE | Noted: 2022-04-23

## 2023-01-16 PROBLEM — 85057007 LIVER CYST: Status: ACTIVE | Noted: 2022-04-23

## 2023-04-11 ENCOUNTER — TELEPHONE ENCOUNTER (OUTPATIENT)
Dept: URBAN - METROPOLITAN AREA CLINIC 86 | Facility: CLINIC | Age: 63
End: 2023-04-11

## 2023-04-11 ENCOUNTER — OFFICE VISIT (OUTPATIENT)
Dept: URBAN - METROPOLITAN AREA CLINIC 91 | Facility: CLINIC | Age: 63
End: 2023-04-11
Payer: COMMERCIAL

## 2023-04-11 DIAGNOSIS — K76.89 HEPATIC CYST: ICD-10-CM

## 2023-04-11 PROCEDURE — 76705 ECHO EXAM OF ABDOMEN: CPT

## 2023-04-11 PROCEDURE — 93975 VASCULAR STUDY: CPT

## 2023-04-11 NOTE — HPI-TODAY'S VISIT:
Melina White, April 11th ultrasound shows liver to be normal in size/contour with top normal echogenicity.  They did see a 1.2 x 1.3 cm simple subcapsular right hepatic lobe cyst that is stable to minimally increased in size.  No suspicious liver lesions seen. Gallbladder unremarkable with no stones. Common bile duct normal at 2.8 mm. Imaged pancreas portions unremarkable with tail partially obscured by gas. Right kidney 10.9 cm with no hydronephrosis. Spleen measures up to 11.2 cm with no suspicious lesions. Liver and splenic vessels patent. In summary no evidence of any overt cirrhotic morphology or suspicious liver lesions and a 1.3 cm subcapsular right hepatic lobe cyst that stable to slightly increased in size. We will review this further at your visit that is coming up. Dr. Sen

## 2023-04-17 ENCOUNTER — LAB OUTSIDE AN ENCOUNTER (OUTPATIENT)
Dept: URBAN - METROPOLITAN AREA CLINIC 92 | Facility: CLINIC | Age: 63
End: 2023-04-17

## 2023-04-18 ENCOUNTER — OFFICE VISIT (OUTPATIENT)
Dept: URBAN - METROPOLITAN AREA TELEHEALTH 2 | Facility: TELEHEALTH | Age: 63
End: 2023-04-18
Payer: COMMERCIAL

## 2023-04-18 VITALS — BODY MASS INDEX: 22.4 KG/M2 | HEIGHT: 71 IN | WEIGHT: 160 LBS

## 2023-04-18 DIAGNOSIS — K76.89 LIVER CYST: ICD-10-CM

## 2023-04-18 DIAGNOSIS — R77.2 ELEVATED AFP: ICD-10-CM

## 2023-04-18 DIAGNOSIS — B18.1 CHRONIC HEPATITIS B: ICD-10-CM

## 2023-04-18 DIAGNOSIS — R89.8 ELEVATED TUMOR MARKERS: ICD-10-CM

## 2023-04-18 PROCEDURE — 99214 OFFICE O/P EST MOD 30 MIN: CPT

## 2023-04-18 NOTE — HPI-TODAY'S VISIT:
Patient is a 62-year-old male last seen Dec 2022 and referred for evaluation of hepatitis B issues.  A copy of the note will be sent to the referring provider.  Saw u.s but not labs. In town so can get that done.  April 11th ultrasound shows liver to be normal in size/contour with top normal echogenicity.  They did see a 1.2 x 1.3 cm simple subcapsular right hepatic lobe cyst that is stable to minimally increased in size.  No suspicious liver lesions seen. Gallbladder unremarkable with no stones. Common bile duct normal at 2.8 mm. Imaged pancreas portions unremarkable with tail partially obscured by gas. Right kidney 10.9 cm with no hydronephrosis. Spleen measures up to 11.2 cm with no suspicious lesions. Liver and splenic vessels patent. In summary no evidence of any overt cirrhotic morphology or suspicious liver lesions and a 1.3 cm subcapsular right hepatic lobe cyst that stable to slightly increased in size. We will review this further at your visit that is coming up.  Dec 27 u.s shows pancreas not able to be seen due overlying bowel gas. Liver was normal in size and smooth in contour and mildly echogenic but without significant change versus May 2022 u.s.  They felt it represented a mild degree of fatty infiltration. They stated also that it remained mildly heterogeneous and without significant change. A 9.7 x 7.8 x 10.1 mm cyst was seen in the peripheral and posterior aspect of segment 7 and it was slightly larger but otherwise stable. No gallstones were seen in the gallbladder.  No bile duct dilation was seen.  Common hepatic bile duct was normal at 3.6 mm.  They could not see the more distal common bile duct due to gas as well. Right kidney was 10.6 cm. Spleen was 9.4 cm. Liver vessels were patent as expected. Overall they felt the liver still had mild fatty changes and the tiny cyst was slightly larger but was otherwise stable.  No gallstones were seen.  The spleen was normal sized at 9.4 cm. Given these findings and ecxam limits and the AFP remaining elevated at 10.3, we would recommend that you redo the ultrasound and labs again in the timeframe of April.  We can also change your telehealth appointment to that timeframe.  We will ask Eileen at [phone] ext [phone] to reach out to you to set this up.   December 16th labs are back.  The complete blood count including the hemoglobin MCV, and platelets are normal.  The complete metabolic panel shows the creatinine is 1.14, sodium 144, potassium 4.3, bilirubin 0.8, alkaline phosphatase 55, AST 23, ALT 14 previously and in April the AST was 30 and the ALT was 17 so these are better.  The hepatitis B virus level is 121 so less than 2000.  Surface antigen is reactive.  The AFP was 10.3 previously in April it was 10.9.  We will see what the ultrasound shows us and as discussed we may need more advanced imaging.   My office staff was able to get the May 27 AHI report sent in. There was some motion limitations on the scan. Lower chest visualized area showed no significant hilar effusion at the lung bases. The liver was within normal limits for size with the right lobe measuring 16.5 cm. No significant liver fat was seen. There are a few small T2 hyperintense and T1 hypointense hepatic cyst seen measuring 1.4 cm in the posterior right lobe and 0.8 cm near the gallbladder fossa. Several other scattered additional subcentimeter T2 hyperintense foci likely hepatic cysts and/or biliary hamartomas were seen throughout the liver. No suspicious enhancing liver lesions were seen. Liver fat was normal at 5%. The gallbladder was unremarkable. No intra or extrahepatic bile duct dilation was seen. Spleen was normal with regards to size. Adrenal glands were unremarkable. Pancreas was unremarkable. Kidneys showed no hydronephrosis but there is a 1.5 cm cyst seen in the left kidney. Liver vessels were patent. They did see scattered colon diverticuli in the visualized colon but no signs of diverticulitis. Overall they felt that you had no significant liver fat but they did see multiple small hepatic cysts and/or biliary hamartomas.  No suspicious liver lesions were seen.   May 3 ultrasound shows the right upper quadrant study to show no gallstones in the gallbladder. Common bile duct normal at 4 mm. Liver however appeared to be coarsened in regards to the echotexture to them. A 9 mm right lobe liver cyst was seen. The pancreas was not well seen due to gas. The right kidney measured 11.1 cm. Spleen measured 10.1 cm. Liver vessels were patent. They were concerned about the liver coarsening as to possible underlying fibrosis and/or cirrhosis risk. Typically we would recommend to do an MRI to assess this further. I looked back at notes regarding prior studies for you and when I looked at the last ultrasound in 2017 it did not mention any coarsening but it did mention the 1.5 x 1.1 x 1.2 cm cyst of the liver. The last October 2017 MRI mentioned a cyst but supposedly the liver was otherwise unremarkable. As you recall from your recent lab review hep B DNA remains low at 40 and the AST 30 and ALT of 17 and the platelet count normal. I tried to call you to discuss the results but could not get you. I will send this via portal to you and if you agree to do the mri we will order it to be done now to be sure. We look forward to hearing back from you.  April 25 labs show hepatitis A immunity is not seen and you should consider getting the hepatitis A vaccine series.  It is a series of 2 doses and can prevent you against getting hepatitis A on top of your current chronic hepatitis issues which would have more risk to cause issues in you versus if you did not have the chronic hepatitis. AFP remains elevated slightly at 10.9 normal being less than 8.4. Hepatitis B E antigen is negative hepatitis B E antibody is positive.  This puts you in a lower replicative state.  Very common to see at your age. Hepatitis B DNA is low at 40.  By itself not meeting criteria for treatment as hbv B dna is less than 2000. Glucose 93 BUN of 14 creatinine 1.10 sodium 139 potassium 4.3 calcium 9.4 albumin 4.8 bilirubin 0.8 alkaline phosphatase 55 AST 30 and ALT 17.  Ideal ALT is less than 35. White blood cell count 6.0 hemoglobin 13.9 platelet count 284 MCV 87 and neutrophils 3.2 lymphocytes 2.1. I would normally recommend with these labs to do the ultrasound imaging and blood work every 4 months and to do a more advanced scan if the ultrasound has any issues noted.  I am still awaiting the ultrasound imaging.   Records review: Patient previously had seen Dr. Baez here back in 2016. Not much of that info.  Recently had been seen by Dr. Ozzie Scott of [facility].  Please see a note here from November 6, 2018 that mentions that the patient has chronic hep B and postprandial belching and bloating.  Ultrasound ordered for liver screening was not done.  EGD and colon was also not done.  Patient apparently had issues getting the ultrasound done at [facility].  He was reported to have low viral load and liver enzymes that were negative and E antibody positive state.    They stressed the importance of screening.  Same 165 still.  Weight at that time was 165 pounds height of 5 foot 10 BMI 23.68.    December 7, 2016 labs show white blood cell count 7.5 hemoglobin 14.2 glucose 104 BUN of 16 creatinine 1.18 albumin 4.5 AST 26 ALT 15 alkaline phosphatase 55 bilirubin 0.6.  July 22, 2015 colonoscopy showed 4 polyps in the right colon ranging from 3 to 5 mm removed with hot snare and 2 polyps in the transverse colon ranging from 2 to 3 mm removed with cold snare and sigmoid diverticulosis and internal hemorrhoids.  Last colon 2019 and only one polyp then.  Pathology from the right colon polypectomy show fragments of tubular adenoma and fragments of hyperplastic polyp and submucosal lipoma transverse colon polyp was hyperplastic and tubular adenoma.  May 2016 B surface antigen positive E antibody positive viral load 8 await.  January 2017 labs showed AFP elevated 15.3 viral load 480 AST 31.  March 2017 ultrasound mentions pancreas poorly seen no definite abnormality liver was remarkable for a mildly complicated 1.5 x 1.1 x 1.2 cm cyst in the inferior aspect and no gallstones.  October 2017 MRI showed small cyst in the posterior segment the right liver otherwise unremarkable. They recommended the patient to have tumor screening and that he could potentially do alternating ultrasound with MRI.  They wanted to see him in 6 months.  He has not done any more since then.  Pt says he went to donate blood and he is from [place].  He says had letter then. He saw specialist then and he was 17 and told not to worry.  He was seeing Dr Baez and Dr Scott for gi issues and then mentioned this and led to the work up.  Do not see any notes from that timeframe but do see notes from earlier in April 2018 where again he was being seen for the hepatitis B and labs from September 2017 showed AST 27 ALT 16 total bili 0.8 in May 2016 labs reference viral load 8 await.  December 2016 labs show white count 7.5 hemoglobin 14.2 creatinine 1.0 AST 31 ALT 21  Past medical history chronic hep B, colon polyps, back pain.  Past surgeries include colonoscopy 2015, nasal septal surgery, fractured left wrist.  Social history not a smoker but occasional social alcohol.  Father with prostate cancer and kidney stones.  Mother with thyroid disease.  We did see a hep B panel (from November 6, 2018 a level of 300 hep B April 2018 for 50 September 2017 230 ALT over the same timeframe have been 16, 15, and 16.  We did confirm that January 9, 2017 AFP that was elevated at 15.3 and normal was less than 8.3.  We saw an EGD / colon from October 2019 showed a small hiatal hernia mild gastritis and a 5 mm ascending colon polyp and diverticulosis.  Duodenum showed no significant histologic changes stomach showed no H. pylori and ascending colon showed vegetable matter.  Need to caught on labs and u.s. Plan 1.  Plan to do the mri in 4m. 2.  Pt will do labs now and in 4m. 3. pt will call if issues.   Duration of the visit was 32 minutes with 10 minutes of chart prep and 22 minutes [facility] with time reviewing their prior and recent records and labs and discussing their current status and future plans for care for the patient.

## 2023-04-21 ENCOUNTER — WEB ENCOUNTER (OUTPATIENT)
Dept: URBAN - METROPOLITAN AREA CLINIC 92 | Facility: CLINIC | Age: 63
End: 2023-04-21

## 2023-04-21 ENCOUNTER — WEB ENCOUNTER (OUTPATIENT)
Dept: URBAN - METROPOLITAN AREA CLINIC 86 | Facility: CLINIC | Age: 63
End: 2023-04-21

## 2023-04-25 ENCOUNTER — LAB OUTSIDE AN ENCOUNTER (OUTPATIENT)
Dept: URBAN - METROPOLITAN AREA CLINIC 86 | Facility: CLINIC | Age: 63
End: 2023-04-25

## 2023-06-09 ENCOUNTER — LAB OUTSIDE AN ENCOUNTER (OUTPATIENT)
Dept: URBAN - METROPOLITAN AREA TELEHEALTH 2 | Facility: TELEHEALTH | Age: 63
End: 2023-06-09

## 2023-07-11 ENCOUNTER — APPOINTMENT (RX ONLY)
Dept: URBAN - METROPOLITAN AREA OTHER 4 | Facility: OTHER | Age: 63
Setting detail: DERMATOLOGY
End: 2023-07-11

## 2023-07-11 DIAGNOSIS — B36.0 PITYRIASIS VERSICOLOR: ICD-10-CM

## 2023-07-11 DIAGNOSIS — L81.9 DISORDER OF PIGMENTATION, UNSPECIFIED: ICD-10-CM

## 2023-07-11 DIAGNOSIS — L82.1 OTHER SEBORRHEIC KERATOSIS: ICD-10-CM

## 2023-07-11 PROCEDURE — ? COUNSELING

## 2023-07-11 PROCEDURE — ? PRESCRIPTION

## 2023-07-11 PROCEDURE — ? MEDICATION COUNSELING

## 2023-07-11 PROCEDURE — 99213 OFFICE O/P EST LOW 20 MIN: CPT

## 2023-07-11 PROCEDURE — ? PRESCRIPTION MEDICATION MANAGEMENT

## 2023-07-11 RX ORDER — KETOCONAZOLE 20 MG/ML
SHAMPOO, SUSPENSION TOPICAL QD
Qty: 120 | Refills: 4 | COMMUNITY
Start: 2023-07-11

## 2023-07-11 RX ADMIN — KETOCONAZOLE: 20 SHAMPOO, SUSPENSION TOPICAL at 00:00

## 2023-07-11 ASSESSMENT — LOCATION SIMPLE DESCRIPTION DERM
LOCATION SIMPLE: LEFT UPPER BACK
LOCATION SIMPLE: RIGHT ZYGOMA
LOCATION SIMPLE: LEFT PRETIBIAL REGION
LOCATION SIMPLE: UPPER BACK
LOCATION SIMPLE: RIGHT UPPER BACK
LOCATION SIMPLE: LEFT EAR
LOCATION SIMPLE: RIGHT PRETIBIAL REGION

## 2023-07-11 ASSESSMENT — LOCATION ZONE DERM
LOCATION ZONE: FACE
LOCATION ZONE: LEG
LOCATION ZONE: TRUNK
LOCATION ZONE: EAR

## 2023-07-11 ASSESSMENT — LOCATION DETAILED DESCRIPTION DERM
LOCATION DETAILED: INFERIOR THORACIC SPINE
LOCATION DETAILED: RIGHT DISTAL PRETIBIAL REGION
LOCATION DETAILED: LEFT PROXIMAL PRETIBIAL REGION
LOCATION DETAILED: RIGHT MEDIAL ZYGOMA
LOCATION DETAILED: RIGHT SUPERIOR LATERAL UPPER BACK
LOCATION DETAILED: LEFT POSTERIOR EAR
LOCATION DETAILED: LEFT SUPERIOR UPPER BACK

## 2023-07-11 NOTE — PROCEDURE: MEDICATION COUNSELING
Tetracycline Counseling: Patient counseled regarding possible photosensitivity and increased risk for sunburn.  Patient instructed to avoid sunlight, if possible.  When exposed to sunlight, patients should wear protective clothing, sunglasses, and sunscreen.  The patient was instructed to call the office immediately if the following severe adverse effects occur:  hearing changes, easy bruising/bleeding, severe headache, or vision changes.  The patient verbalized understanding of the proper use and possible adverse effects of tetracycline.  All of the patient's questions and concerns were addressed. Patient understands to avoid pregnancy while on therapy due to potential birth defects.
Birth Control Pills Counseling: Birth Control Pill Counseling: I discussed with the patient the potential side effects of OCPs including but not limited to increased risk of stroke, heart attack, thrombophlebitis, deep venous thrombosis, hepatic adenomas, breast changes, GI upset, headaches, and depression.  The patient verbalized understanding of the proper use and possible adverse effects of OCPs. All of the patient's questions and concerns were addressed.
Dupixent Counseling: I discussed with the patient the risks of dupilumab including but not limited to eye inflammation and irritation, cold sores, injection site reactions, allergic reactions and increased risk of parasitic infection. The patient understands that monitoring is required and they must alert us or the primary physician if symptoms of infection or other concerning signs are noted.
Cyclophosphamide Pregnancy And Lactation Text: This medication is Pregnancy Category D and it isn't considered safe during pregnancy. This medication is excreted in breast milk.
Erivedge Pregnancy And Lactation Text: This medication is Pregnancy Category X and is absolutely contraindicated during pregnancy. It is unknown if it is excreted in breast milk.
Low Dose Naltrexone Counseling- I discussed with the patient the potential risks and side effects of low dose naltrexone including but not limited to: more vivid dreams, headaches, nausea, vomiting, abdominal pain, fatigue, dizziness, and anxiety.
Albendazole Pregnancy And Lactation Text: This medication is Pregnancy Category C and it isn't known if it is safe during pregnancy. It is also excreted in breast milk.
Topical Clindamycin Counseling: Patient counseled that this medication may cause skin irritation or allergic reactions.  In the event of skin irritation, the patient was advised to reduce the amount of the drug applied or use it less frequently.   The patient verbalized understanding of the proper use and possible adverse effects of clindamycin.  All of the patient's questions and concerns were addressed.
Minoxidil Pregnancy And Lactation Text: This medication has not been assigned a Pregnancy Risk Category but animal studies failed to show danger with the topical medication. It is unknown if the medication is excreted in breast milk.
Colchicine Pregnancy And Lactation Text: This medication is Pregnancy Category C and isn't considered safe during pregnancy. It is excreted in breast milk.
Xeljanz Counseling: I discussed with the patient the risks of Xeljanz therapy including increased risk of infection, liver issues, headache, diarrhea, or cold symptoms. Live vaccines should be avoided. They were instructed to call if they have any problems.
High Dose Vitamin A Pregnancy And Lactation Text: High dose vitamin A therapy is contraindicated during pregnancy and breast feeding.
Infliximab Pregnancy And Lactation Text: This medication is Pregnancy Category B and is considered safe during pregnancy. It is unknown if this medication is excreted in breast milk.
Libtayo Counseling- I discussed with the patient the risks of Libtayo including but not limited to nausea, vomiting, diarrhea, and bone or muscle pain.  The patient verbalized understanding of the proper use and possible adverse effects of Libtayo.  All of the patient's questions and concerns were addressed.
Xeldkz Pregnancy And Lactation Text: This medication is Pregnancy Category D and is not considered safe during pregnancy.  The risk during breast feeding is also uncertain.
Low Dose Naltrexone Pregnancy And Lactation Text: Naltrexone is pregnancy category C.  There have been no adequate and well-controlled studies in pregnant women.  It should be used in pregnancy only if the potential benefit justifies the potential risk to the fetus.   Limited data indicates that naltrexone is minimally excreted into breastmilk.
Ivermectin Counseling:  Patient instructed to take medication on an empty stomach with a full glass of water.  Patient informed of potential adverse effects including but not limited to nausea, diarrhea, dizziness, itching, and swelling of the extremities or lymph nodes.  The patient verbalized understanding of the proper use and possible adverse effects of ivermectin.  All of the patient's questions and concerns were addressed.
Mirvaso Counseling: Mirvaso is a topical medication which can decrease superficial blood flow where applied. Side effects are uncommon and include stinging, redness and allergic reactions.
Cephalexin Counseling: I counseled the patient regarding use of cephalexin as an antibiotic for prophylactic and/or therapeutic purposes. Cephalexin (commonly prescribed under brand name Keflex) is a cephalosporin antibiotic which is active against numerous classes of bacteria, including most skin bacteria. Side effects may include nausea, diarrhea, gastrointestinal upset, rash, hives, yeast infections, and in rare cases, hepatitis, kidney disease, seizures, fever, confusion, neurologic symptoms, and others. Patients with severe allergies to penicillin medications are cautioned that there is about a 10% incidence of cross-reactivity with cephalosporins. When possible, patients with penicillin allergies should use alternatives to cephalosporins for antibiotic therapy.
Rituxan Counseling:  I discussed with the patient the risks of Rituxan infusions. Side effects can include infusion reactions, severe drug rashes including mucocutaneous reactions, reactivation of latent hepatitis and other infections and rarely progressive multifocal leukoencephalopathy.  All of the patient's questions and concerns were addressed.
Calcipotriene Counseling:  I discussed with the patient the risks of calcipotriene including but not limited to erythema, scaling, itching, and irritation.
Metronidazole Pregnancy And Lactation Text: This medication is Pregnancy Category B and considered safe during pregnancy.  It is also excreted in breast milk.
Zyclara Pregnancy And Lactation Text: This medication is Pregnancy Category C. It is unknown if this medication is excreted in breast milk.
Ketoconazole Pregnancy And Lactation Text: This medication is Pregnancy Category C and it isn't know if it is safe during pregnancy. It is also excreted in breast milk and breast feeding isn't recommended.
Qbrexza Pregnancy And Lactation Text: There is no available data on Qbrexza use in pregnant women.  There is no available data on Qbrexza use in lactation.
Otezla Counseling: The side effects of Otezla were discussed with the patient, including but not limited to worsening or new depression, weight loss, diarrhea, nausea, upper respiratory tract infection, and headache. Patient instructed to call the office should any adverse effect occur.  The patient verbalized understanding of the proper use and possible adverse effects of Otezla.  All the patient's questions and concerns were addressed.
Wartpeel Counseling:  I discussed with the patient the risks of Wartpeel including but not limited to erythema, scaling, itching, weeping, crusting, and pain.
Minocycline Counseling: Patient advised regarding possible photosensitivity and discoloration of the teeth, skin, lips, tongue and gums.  Patient instructed to avoid sunlight, if possible.  When exposed to sunlight, patients should wear protective clothing, sunglasses, and sunscreen.  The patient was instructed to call the office immediately if the following severe adverse effects occur:  hearing changes, easy bruising/bleeding, severe headache, or vision changes.  The patient verbalized understanding of the proper use and possible adverse effects of minocycline.  All of the patient's questions and concerns were addressed.
Tetracycline Pregnancy And Lactation Text: This medication is Pregnancy Category D and not consider safe during pregnancy. It is also excreted in breast milk.
Terbinafine Counseling: Patient counseling regarding adverse effects of terbinafine including but not limited to headache, diarrhea, rash, upset stomach, liver function test abnormalities, itching, taste/smell disturbance, nausea, abdominal pain, and flatulence.  There is a rare possibility of liver failure that can occur when taking terbinafine.  The patient understands that a baseline LFT and kidney function test may be required. The patient verbalized understanding of the proper use and possible adverse effects of terbinafine.  All of the patient's questions and concerns were addressed.
Dupixent Pregnancy And Lactation Text: This medication likely crosses the placenta but the risk for the fetus is uncertain. This medication is excreted in breast milk.
Calcipotriene Pregnancy And Lactation Text: The use of this medication during pregnancy or lactation is not recommended as there is insufficient data.
Topical Clindamycin Pregnancy And Lactation Text: This medication is Pregnancy Category B and is considered safe during pregnancy. It is unknown if it is excreted in breast milk.
Solaraze Counseling:  I discussed with the patient the risks of Solaraze including but not limited to erythema, scaling, itching, weeping, crusting, and pain.
Dapsone Counseling: I discussed with the patient the risks of dapsone including but not limited to hemolytic anemia, agranulocytosis, rashes, methemoglobinemia, kidney failure, peripheral neuropathy, headaches, GI upset, and liver toxicity.  Patients who start dapsone require monitoring including baseline LFTs and weekly CBCs for the first month, then every month thereafter.  The patient verbalized understanding of the proper use and possible adverse effects of dapsone.  All of the patient's questions and concerns were addressed.
Cibinqo Counseling: I discussed with the patient the risks of Cibinqo therapy including but not limited to common cold, nausea, headache, cold sores, increased blood CPK levels, dizziness, UTIs, fatigue, acne, and vomitting. Live vaccines should be avoided.  This medication has been linked to serious infections; higher rate of mortality; malignancy and lymphoproliferative disorders; major adverse cardiovascular events; thrombosis; thrombocytopenia and lymphopenia; lipid elevations; and retinal detachment.
Birth Control Pills Pregnancy And Lactation Text: This medication should be avoided if pregnant and for the first 30 days post-partum.
Opioid Counseling: I discussed with the patient the potential side effects of opioids including but not limited to addiction, altered mental status, and depression. I stressed avoiding alcohol, benzodiazepines, muscle relaxants and sleep aids unless specifically okayed by a physician. The patient verbalized understanding of the proper use and possible adverse effects of opioids. All of the patient's questions and concerns were addressed. They were instructed to flush the remaining pills down the toilet if they did not need them for pain.
Detail Level: Simple
Rituxan Pregnancy And Lactation Text: This medication is Pregnancy Category C and it isn't know if it is safe during pregnancy. It is unknown if this medication is excreted in breast milk but similar antibodies are known to be excreted.
Aklief counseling:  Patient advised to apply a pea-sized amount only at bedtime and wait 30 minutes after washing their face before applying.  If too drying, patient may add a non-comedogenic moisturizer.  The most commonly reported side effects including irritation, redness, scaling, dryness, stinging, burning, itching, and increased risk of sunburn.  The patient verbalized understanding of the proper use and possible adverse effects of retinoids.  All of the patient's questions and concerns were addressed.
Enbrel Counseling:  I discussed with the patient the risks of etanercept including but not limited to myelosuppression, immunosuppression, autoimmune hepatitis, demyelinating diseases, lymphoma, and infections.  The patient understands that monitoring is required including a PPD at baseline and must alert us or the primary physician if symptoms of infection or other concerning signs are noted.
Cephalexin Pregnancy And Lactation Text: This medication is Pregnancy Category B and considered safe during pregnancy.  It is also excreted in breast milk but can be used safely for shorter doses.
Cantharidin Counseling:  I discussed with the patient the risks of Cantharidin including but not limited to pain, redness, burning, itching, and blistering.
Spironolactone Counseling: Patient advised regarding risks of diarrhea, abdominal pain, hyperkalemia, birth defects (for female patients), liver toxicity and renal toxicity. The patient may need blood work to monitor liver and kidney function and potassium levels while on therapy. The patient verbalized understanding of the proper use and possible adverse effects of spironolactone.  All of the patient's questions and concerns were addressed.
Topical Ketoconazole Counseling: Patient counseled that this medication may cause skin irritation or allergic reactions.  In the event of skin irritation, the patient was advised to reduce the amount of the drug applied or use it less frequently.   The patient verbalized understanding of the proper use and possible adverse effects of ketoconazole.  All of the patient's questions and concerns were addressed.
Hydroquinone Counseling:  Patient advised that medication may result in skin irritation, lightening (hypopigmentation), dryness, and burning.  In the event of skin irritation, the patient was advised to reduce the amount of the drug applied or use it less frequently.  Rarely, spots that are treated with hydroquinone can become darker (pseudoochronosis).  Should this occur, patient instructed to stop medication and call the office. The patient verbalized understanding of the proper use and possible adverse effects of hydroquinone.  All of the patient's questions and concerns were addressed.
Cibinqo Pregnancy And Lactation Text: It is unknown if this medication will adversely affect pregnancy or breast feeding.  You should not take this medication if you are currently pregnant or planning a pregnancy or while breastfeeding.
Wartpeel Pregnancy And Lactation Text: This medication is Pregnancy Category X and contraindicated in pregnancy and in women who may become pregnant. It is unknown if this medication is excreted in breast milk.
Otezla Pregnancy And Lactation Text: This medication is Pregnancy Category C and it isn't known if it is safe during pregnancy. It is unknown if it is excreted in breast milk.
Mirvaso Pregnancy And Lactation Text: This medication has not been assigned a Pregnancy Risk Category. It is unknown if the medication is excreted in breast milk.
Rhofade Counseling: Rhofade is a topical medication which can decrease superficial blood flow where applied. Side effects are uncommon and include stinging, redness and allergic reactions.
Libtayo Pregnancy And Lactation Text: This medication is contraindicated in pregnancy and when breast feeding.
Niacinamide Counseling: I recommended taking niacin or niacinamide, also know as vitamin B3, twice daily. Recent evidence suggests that taking vitamin B3 (500 mg twice daily) can reduce the risk of actinic keratoses and non-melanoma skin cancers. Side effects of vitamin B3 include flushing and headache.
Taltz Counseling: I discussed with the patient the risks of ixekizumab including but not limited to immunosuppression, serious infections, worsening of inflammatory bowel disease and drug reactions.  The patient understands that monitoring is required including a PPD at baseline and must alert us or the primary physician if symptoms of infection or other concerning signs are noted.
Oxybutynin Counseling:  I discussed with the patient the risks of oxybutynin including but not limited to skin rash, drowsiness, dry mouth, difficulty urinating, and blurred vision.
Winlevi Counseling:  I discussed with the patient the risks of topical clascoterone including but not limited to erythema, scaling, itching, and stinging. Patient voiced their understanding.
Terbinafine Pregnancy And Lactation Text: This medication is Pregnancy Category B and is considered safe during pregnancy. It is also excreted in breast milk and breast feeding isn't recommended.
Taltz Pregnancy And Lactation Text: The risk during pregnancy and breastfeeding is uncertain with this medication.
Dapsone Pregnancy And Lactation Text: This medication is Pregnancy Category C and is not considered safe during pregnancy or breast feeding.
Cantharidin Pregnancy And Lactation Text: This medication has not been proven safe during pregnancy. It is unknown if this medication is excreted in breast milk.
Solaraze Pregnancy And Lactation Text: This medication is Pregnancy Category B and is considered safe. There is some data to suggest avoiding during the third trimester. It is unknown if this medication is excreted in breast milk.
Acitretin Counseling:  I discussed with the patient the risks of acitretin including but not limited to hair loss, dry lips/skin/eyes, liver damage, hyperlipidemia, depression/suicidal ideation, photosensitivity.  Serious rare side effects can include but are not limited to pancreatitis, pseudotumor cerebri, bony changes, clot formation/stroke/heart attack.  Patient understands that alcohol is contraindicated since it can result in liver toxicity and significantly prolong the elimination of the drug by many years.
Soolantra Counseling: I discussed with the patients the risks of topial Soolantra. This is a medicine which decreases the number of mites and inflammation in the skin. You experience burning, stinging, eye irritation or allergic reactions.  Please call our office if you develop any problems from using this medication.
Spironolactone Pregnancy And Lactation Text: This medication can cause feminization of the male fetus and should be avoided during pregnancy. The active metabolite is also found in breast milk.
Acitretin Pregnancy And Lactation Text: This medication is Pregnancy Category X and should not be given to women who are pregnant or may become pregnant in the future. This medication is excreted in breast milk.
Aklief Pregnancy And Lactation Text: It is unknown if this medication is safe to use during pregnancy.  It is unknown if this medication is excreted in breast milk.  Breastfeeding women should use the topical cream on the smallest area of the skin for the shortest time needed while breastfeeding.  Do not apply to nipple and areola.
Include Pregnancy/Lactation Warning?: No
Gabapentin Counseling: I discussed with the patient the risks of gabapentin including but not limited to dizziness, somnolence, fatigue and ataxia.
Clindamycin Counseling: I counseled the patient regarding use of clindamycin as an antibiotic for prophylactic and/or therapeutic purposes. Clindamycin is active against numerous classes of bacteria, including skin bacteria. Side effects may include nausea, diarrhea, gastrointestinal upset, rash, hives, yeast infections, and in rare cases, colitis.
5-Fu Counseling: 5-Fluorouracil Counseling:  I discussed with the patient the risks of 5-fluorouracil including but not limited to erythema, scaling, itching, weeping, crusting, and pain.
Opioid Pregnancy And Lactation Text: These medications can lead to premature delivery and should be avoided during pregnancy. These medications are also present in breast milk in small amounts.
Siliq Counseling:  I discussed with the patient the risks of Siliq including but not limited to new or worsening depression, suicidal thoughts and behavior, immunosuppression, malignancy, posterior leukoencephalopathy syndrome, and serious infections.  The patient understands that monitoring is required including a PPD at baseline and must alert us or the primary physician if symptoms of infection or other concerning signs are noted. There is also a special program designed to monitor depression which is required with Siliq.
Olumiant Counseling: I discussed with the patient the risks of Olumiant therapy including but not limited to upper respiratory tract infections, shingles, cold sores, and nausea. Live vaccines should be avoided.  This medication has been linked to serious infections; higher rate of mortality; malignancy and lymphoproliferative disorders; major adverse cardiovascular events; thrombosis; gastrointestinal perforations; neutropenia; lymphopenia; anemia; liver enzyme elevations; and lipid elevations.
Niacinamide Pregnancy And Lactation Text: These medications are considered safe during pregnancy.
Odomzo Counseling- I discussed with the patient the risks of Odomzo including but not limited to nausea, vomiting, diarrhea, constipation, weight loss, changes in the sense of taste, decreased appetite, muscle spasms, and hair loss.  The patient verbalized understanding of the proper use and possible adverse effects of Odomzo.  All of the patient's questions and concerns were addressed.
Fluconazole Counseling:  Patient counseled regarding adverse effects of fluconazole including but not limited to headache, diarrhea, nausea, upset stomach, liver function test abnormalities, taste disturbance, and stomach pain.  There is a rare possibility of liver failure that can occur when taking fluconazole.  The patient understands that monitoring of LFTs and kidney function test may be required, especially at baseline. The patient verbalized understanding of the proper use and possible adverse effects of fluconazole.  All of the patient's questions and concerns were addressed.
Opzelura Counseling:  I discussed with the patient the risks of Opzelura including but not limited to nasopharngitis, bronchitis, ear infection, eosinophila, hives, diarrhea, folliculitis, tonsillitis, and rhinorrhea.  Taken orally, this medication has been linked to serious infections; higher rate of mortality; malignancy and lymphoproliferative disorders; major adverse cardiovascular events; thrombosis; thrombocytopenia, anemia, and neutropenia; and lipid elevations.
Clindamycin Pregnancy And Lactation Text: This medication can be used in pregnancy if certain situations. Clindamycin is also present in breast milk.
Tremfya Counseling: I discussed with the patient the risks of guselkumab including but not limited to immunosuppression, serious infections, and drug reactions.  The patient understands that monitoring is required including a PPD at baseline and must alert us or the primary physician if symptoms of infection or other concerning signs are noted.
Cyclosporine Counseling:  I discussed with the patient the risks of cyclosporine including but not limited to hypertension, gingival hyperplasia,myelosuppression, immunosuppression, liver damage, kidney damage, neurotoxicity, lymphoma, and serious infections. The patient understands that monitoring is required including baseline blood pressure, CBC, CMP, lipid panel and uric acid, and then 1-2 times monthly CMP and blood pressure.
Doxepin Counseling:  Patient advised that the medication is sedating and not to drive a car after taking this medication. Patient informed of potential adverse effects including but not limited to dry mouth, urinary retention, and blurry vision.  The patient verbalized understanding of the proper use and possible adverse effects of doxepin.  All of the patient's questions and concerns were addressed.
Tranexamic Acid Counseling:  Patient advised of the small risk of bleeding problems with tranexamic acid. They were also instructed to call if they developed any nausea, vomiting or diarrhea. All of the patient's questions and concerns were addressed.
Quinolones Counseling:  I discussed with the patient the risks of fluoroquinolones including but not limited to GI upset, allergic reaction, drug rash, diarrhea, dizziness, photosensitivity, yeast infections, liver function test abnormalities, tendonitis/tendon rupture.
Fluconazole Pregnancy And Lactation Text: This medication is Pregnancy Category C and it isn't know if it is safe during pregnancy. It is also excreted in breast milk.
Opzelura Pregnancy And Lactation Text: There is insufficient data to evaluate drug-associated risk for major birth defects, miscarriage, or other adverse maternal or fetal outcomes.  There is a pregnancy registry that monitors pregnancy outcomes in pregnant persons exposed to the medication during pregnancy.  It is unknown if this medication is excreted in breast milk.  Do not breastfeed during treatment and for about 4 weeks after the last dose.
Winlevi Pregnancy And Lactation Text: This medication is considered safe during pregnancy and breastfeeding.
Adbry Counseling: I discussed with the patient the risks of tralokinumab including but not limited to eye infection and irritation, cold sores, injection site reactions, worsening of asthma, allergic reactions and increased risk of parasitic infection.  Live vaccines should be avoided while taking tralokinumab. The patient understands that monitoring is required and they must alert us or the primary physician if symptoms of infection or other concerning signs are noted.
Humira Counseling:  I discussed with the patient the risks of adalimumab including but not limited to myelosuppression, immunosuppression, autoimmune hepatitis, demyelinating diseases, lymphoma, and serious infections.  The patient understands that monitoring is required including a PPD at baseline and must alert us or the primary physician if symptoms of infection or other concerning signs are noted.
Adbry Pregnancy And Lactation Text: It is unknown if this medication will adversely affect pregnancy or breast feeding.
Bexarotene Counseling:  I discussed with the patient the risks of bexarotene including but not limited to hair loss, dry lips/skin/eyes, liver abnormalities, hyperlipidemia, pancreatitis, depression/suicidal ideation, photosensitivity, drug rash/allergic reactions, hypothyroidism, anemia, leukopenia, infection, cataracts, and teratogenicity.  Patient understands that they will need regular blood tests to check lipid profile, liver function tests, white blood cell count, thyroid function tests and pregnancy test if applicable.
Cimetidine Counseling:  I discussed with the patient the risks of Cimetidine including but not limited to gynecomastia, headache, diarrhea, nausea, drowsiness, arrhythmias, pancreatitis, skin rashes, psychosis, bone marrow suppression and kidney toxicity.
Imiquimod Counseling:  I discussed with the patient the risks of imiquimod including but not limited to erythema, scaling, itching, weeping, crusting, and pain.  Patient understands that the inflammatory response to imiquimod is variable from person to person and was educated regarded proper titration schedule.  If flu-like symptoms develop, patient knows to discontinue the medication and contact us.
Nsaids Counseling: NSAID Counseling: I discussed with the patient that NSAIDs should be taken with food. Prolonged use of NSAIDs can result in the development of stomach ulcers.  Patient advised to stop taking NSAIDs if abdominal pain occurs.  The patient verbalized understanding of the proper use and possible adverse effects of NSAIDs.  All of the patient's questions and concerns were addressed.
Olumiant Pregnancy And Lactation Text: Based on animal studies, Olumiant may cause embryo-fetal harm when administered to pregnant women.  The medication should not be used in pregnancy.  Breastfeeding is not recommended during treatment.
Topical Metronidazole Counseling: Metronidazole is a topical antibiotic medication. You may experience burning, stinging, redness, or allergic reactions.  Please call our office if you develop any problems from using this medication.
Soolantra Pregnancy And Lactation Text: This medication is Pregnancy Category C. This medication is considered safe during breast feeding.
Azelaic Acid Counseling: Patient counseled that medicine may cause skin irritation and to avoid applying near the eyes.  In the event of skin irritation, the patient was advised to reduce the amount of the drug applied or use it less frequently.   The patient verbalized understanding of the proper use and possible adverse effects of azelaic acid.  All of the patient's questions and concerns were addressed.
Topical Metronidazole Pregnancy And Lactation Text: This medication is Pregnancy Category B and considered safe during pregnancy.  It is also considered safe to use while breastfeeding.
Azelaic Acid Pregnancy And Lactation Text: This medication is considered safe during pregnancy and breast feeding.
Simponi Counseling:  I discussed with the patient the risks of golimumab including but not limited to myelosuppression, immunosuppression, autoimmune hepatitis, demyelinating diseases, lymphoma, and serious infections.  The patient understands that monitoring is required including a PPD at baseline and must alert us or the primary physician if symptoms of infection or other concerning signs are noted.
Drysol Counseling:  I discussed with the patient the risks of drysol/aluminum chloride including but not limited to skin rash, itching, irritation, burning.
Cyclosporine Pregnancy And Lactation Text: This medication is Pregnancy Category C and it isn't know if it is safe during pregnancy. This medication is excreted in breast milk.
Doxycycline Counseling:  Patient counseled regarding possible photosensitivity and increased risk for sunburn.  Patient instructed to avoid sunlight, if possible.  When exposed to sunlight, patients should wear protective clothing, sunglasses, and sunscreen.  The patient was instructed to call the office immediately if the following severe adverse effects occur:  hearing changes, easy bruising/bleeding, severe headache, or vision changes.  The patient verbalized understanding of the proper use and possible adverse effects of doxycycline.  All of the patient's questions and concerns were addressed.
Nsaids Pregnancy And Lactation Text: These medications are considered safe up to 30 weeks gestation. It is excreted in breast milk.
Doxepin Pregnancy And Lactation Text: This medication is Pregnancy Category C and it isn't known if it is safe during pregnancy. It is also excreted in breast milk and breast feeding isn't recommended.
Arava Counseling:  Patient counseled regarding adverse effects of Arava including but not limited to nausea, vomiting, abnormalities in liver function tests. Patients may develop mouth sores, rash, diarrhea, and abnormalities in blood counts. The patient understands that monitoring is required including LFTs and blood counts.  There is a rare possibility of scarring of the liver and lung problems that can occur when taking methotrexate. Persistent nausea, loss of appetite, pale stools, dark urine, cough, and shortness of breath should be reported immediately. Patient advised to discontinue Arava treatment and consult with a physician prior to attempting conception. The patient will have to undergo a treatment to eliminate Arava from the body prior to conception.
Griseofulvin Counseling:  I discussed with the patient the risks of griseofulvin including but not limited to photosensitivity, cytopenia, liver damage, nausea/vomiting and severe allergy.  The patient understands that this medication is best absorbed when taken with a fatty meal (e.g., ice cream or french fries).
Azathioprine Counseling:  I discussed with the patient the risks of azathioprine including but not limited to myelosuppression, immunosuppression, hepatotoxicity, lymphoma, and infections.  The patient understands that monitoring is required including baseline LFTs, Creatinine, possible TPMP genotyping and weekly CBCs for the first month and then every 2 weeks thereafter.  The patient verbalized understanding of the proper use and possible adverse effects of azathioprine.  All of the patient's questions and concerns were addressed.
Picato Counseling:  I discussed with the patient the risks of Picato including but not limited to erythema, scaling, itching, weeping, crusting, and pain.
Propranolol Counseling:  I discussed with the patient the risks of propranolol including but not limited to low heart rate, low blood pressure, low blood sugar, restlessness and increased cold sensitivity. They should call the office if they experience any of these side effects.
VTAMA Counseling: I discussed with the patient that VTAMA is not for use in the eyes, mouth or mouth. They should call the office if they develop any signs of allergic reactions to VTAMA. The patient verbalized understanding of the proper use and possible adverse effects of VTAMA.  All of the patient's questions and concerns were addressed.
Tranexamic Acid Pregnancy And Lactation Text: It is unknown if this medication is safe during pregnancy or breast feeding.
Dutasteride Male Counseling: Dustasteride Counseling:  I discussed with the patient the risks of use of dutasteride including but not limited to decreased libido, decreased ejaculate volume, and gynecomastia. Women who can become pregnant should not handle medication.  All of the patient's questions and concerns were addressed.
Rifampin Counseling: I discussed with the patient the risks of rifampin including but not limited to liver damage, kidney damage, red-orange body fluids, nausea/vomiting and severe allergy.
Cimzia Counseling:  I discussed with the patient the risks of Cimzia including but not limited to immunosuppression, allergic reactions and infections.  The patient understands that monitoring is required including a PPD at baseline and must alert us or the primary physician if symptoms of infection or other concerning signs are noted.
Bexarotene Pregnancy And Lactation Text: This medication is Pregnancy Category X and should not be given to women who are pregnant or may become pregnant. This medication should not be used if you are breast feeding.
Xolair Counseling:  Patient informed of potential adverse effects including but not limited to fever, muscle aches, rash and allergic reactions.  The patient verbalized understanding of the proper use and possible adverse effects of Xolair.  All of the patient's questions and concerns were addressed.
Propranolol Pregnancy And Lactation Text: This medication is Pregnancy Category C and it isn't known if it is safe during pregnancy. It is excreted in breast milk.
Glycopyrrolate Counseling:  I discussed with the patient the risks of glycopyrrolate including but not limited to skin rash, drowsiness, dry mouth, difficulty urinating, and blurred vision.
Rinvoq Counseling: I discussed with the patient the risks of Rinvoq therapy including but not limited to upper respiratory tract infections, shingles, cold sores, bronchitis, nausea, cough, fever, acne, and headache. Live vaccines should be avoided.  This medication has been linked to serious infections; higher rate of mortality; malignancy and lymphoproliferative disorders; major adverse cardiovascular events; thrombosis; thrombocytopenia, anemia, and neutropenia; lipid elevations; liver enzyme elevations; and gastrointestinal perforations.
Topical Retinoid counseling:  Patient advised to apply a pea-sized amount only at bedtime and wait 30 minutes after washing their face before applying.  If too drying, patient may add a non-comedogenic moisturizer. The patient verbalized understanding of the proper use and possible adverse effects of retinoids.  All of the patient's questions and concerns were addressed.
Glycopyrrolate Pregnancy And Lactation Text: This medication is Pregnancy Category B and is considered safe during pregnancy. It is unknown if it is excreted breast milk.
Klisyri Counseling:  I discussed with the patient the risks of Klisyri including but not limited to erythema, scaling, itching, weeping, crusting, and pain.
Azithromycin Counseling:  I discussed with the patient the risks of azithromycin including but not limited to GI upset, allergic reaction, drug rash, diarrhea, and yeast infections.
Benzoyl Peroxide Counseling: Patient counseled that medicine may cause skin irritation and bleach clothing.  In the event of skin irritation, the patient was advised to reduce the amount of the drug applied or use it less frequently.   The patient verbalized understanding of the proper use and possible adverse effects of benzoyl peroxide.  All of the patient's questions and concerns were addressed.
Ilumya Counseling: I discussed with the patient the risks of tildrakizumab including but not limited to immunosuppression, malignancy, posterior leukoencephalopathy syndrome, and serious infections.  The patient understands that monitoring is required including a PPD at baseline and must alert us or the primary physician if symptoms of infection or other concerning signs are noted.
Rinvoq Pregnancy And Lactation Text: Based on animal studies, Rinvoq may cause embryo-fetal harm when administered to pregnant women.  The medication should not be used in pregnancy.  Breastfeeding is not recommended during treatment and for 6 days after the last dose.
Griseofulvin Pregnancy And Lactation Text: This medication is Pregnancy Category X and is known to cause serious birth defects. It is unknown if this medication is excreted in breast milk but breast feeding should be avoided.
Azathioprine Pregnancy And Lactation Text: This medication is Pregnancy Category D and isn't considered safe during pregnancy. It is unknown if this medication is excreted in breast milk.
Vtama Pregnancy And Lactation Text: It is unknown if this medication can cause problems during pregnancy and breastfeeding.
Topical Steroids Counseling: I discussed with the patient that prolonged use of topical steroids can result in the increased appearance of superficial blood vessels (telangiectasias), lightening (hypopigmentation) and thinning of the skin (atrophy).  Patient understands to avoid using high potency steroids in skin folds, the groin or the face.  The patient verbalized understanding of the proper use and possible adverse effects of topical steroids.  All of the patient's questions and concerns were addressed.
Olanzapine Counseling- I discussed with the patient the common side effects of olanzapine including but are not limited to: lack of energy, dry mouth, increased appetite, sleepiness, tremor, constipation, dizziness, changes in behavior, or restlessness.  Explained that teenagers are more likely to experience headaches, abdominal pain, pain in the arms or legs, tiredness, and sleepiness.  Serious side effects include but are not limited: increased risk of death in elderly patients who are confused, have memory loss, or dementia-related psychosis; hyperglycemia; increased cholesterol and triglycerides; and weight gain.
Hydroxyzine Counseling: Patient advised that the medication is sedating and not to drive a car after taking this medication.  Patient informed of potential adverse effects including but not limited to dry mouth, urinary retention, and blurry vision.  The patient verbalized understanding of the proper use and possible adverse effects of hydroxyzine.  All of the patient's questions and concerns were addressed.
Doxycycline Pregnancy And Lactation Text: This medication is Pregnancy Category D and not consider safe during pregnancy. It is also excreted in breast milk but is considered safe for shorter treatment courses.
Methotrexate Counseling:  Patient counseled regarding adverse effects of methotrexate including but not limited to nausea, vomiting, abnormalities in liver function tests. Patients may develop mouth sores, rash, diarrhea, and abnormalities in blood counts. The patient understands that monitoring is required including LFT's and blood counts.  There is a rare possibility of scarring of the liver and lung problems that can occur when taking methotrexate. Persistent nausea, loss of appetite, pale stools, dark urine, cough, and shortness of breath should be reported immediately. Patient advised to discontinue methotrexate treatment at least three months before attempting to become pregnant.  I discussed the need for folate supplements while taking methotrexate.  These supplements can decrease side effects during methotrexate treatment. The patient verbalized understanding of the proper use and possible adverse effects of methotrexate.  All of the patient's questions and concerns were addressed.
Valtrex Counseling: I discussed with the patient the risks of valacyclovir including but not limited to kidney damage, nausea, vomiting and severe allergy.  The patient understands that if the infection seems to be worsening or is not improving, they are to call.
SSKI Counseling:  I discussed with the patient the risks of SSKI including but not limited to thyroid abnormalities, metallic taste, GI upset, fever, headache, acne, arthralgias, paraesthesias, lymphadenopathy, easy bleeding, arrhythmias, and allergic reaction.
Zoryve Counseling:  I discussed with the patient that Zoryve is not for use in the eyes, mouth or vagina. The most commonly reported side effects include diarrhea, headache, insomnia, application site pain, upper respiratory tract infections, and urinary tract infections.  All of the patient's questions and concerns were addressed.
Methotrexate Pregnancy And Lactation Text: This medication is Pregnancy Category X and is known to cause fetal harm. This medication is excreted in breast milk.
Valtrex Pregnancy And Lactation Text: this medication is Pregnancy Category B and is considered safe during pregnancy. This medication is not directly found in breast milk but it's metabolite acyclovir is present.
Xolair Pregnancy And Lactation Text: This medication is Pregnancy Category B and is considered safe during pregnancy. This medication is excreted in breast milk.
Rifampin Pregnancy And Lactation Text: This medication is Pregnancy Category C and it isn't know if it is safe during pregnancy. It is also excreted in breast milk and should not be used if you are breast feeding.
Hydroxyzine Pregnancy And Lactation Text: This medication is not safe during pregnancy and should not be taken. It is also excreted in breast milk and breast feeding isn't recommended.
Cellcept Counseling:  I discussed with the patient the risks of mycophenolate mofetil including but not limited to infection/immunosuppression, GI upset, hypokalemia, hypercholesterolemia, bone marrow suppression, lymphoproliferative disorders, malignancy, GI ulceration/bleed/perforation, colitis, interstitial lung disease, kidney failure, progressive multifocal leukoencephalopathy, and birth defects.  The patient understands that monitoring is required including a baseline creatinine and regular CBC testing. In addition, patient must alert us immediately if symptoms of infection or other concerning signs are noted.
Dutasteride Pregnancy And Lactation Text: This medication is absolutely contraindicated in women, especially during pregnancy and breast feeding. Feminization of male fetuses is possible if taking while pregnant.
Isotretinoin Counseling: Patient should get monthly blood tests, not donate blood, not drive at night if vision affected, not share medication, and not undergo elective surgery for 6 months after tx completed. Side effects reviewed, pt to contact office should one occur.
Cimzia Pregnancy And Lactation Text: This medication crosses the placenta but can be considered safe in certain situations. Cimzia may be excreted in breast milk.
Clofazimine Counseling:  I discussed with the patient the risks of clofazimine including but not limited to skin and eye pigmentation, liver damage, nausea/vomiting, gastrointestinal bleeding and allergy.
Isotretinoin Pregnancy And Lactation Text: This medication is Pregnancy Category X and is considered extremely dangerous during pregnancy. It is unknown if it is excreted in breast milk.
Azithromycin Pregnancy And Lactation Text: This medication is considered safe during pregnancy and is also secreted in breast milk.
Benzoyl Peroxide Pregnancy And Lactation Text: This medication is Pregnancy Category C. It is unknown if benzoyl peroxide is excreted in breast milk.
Finasteride Male Counseling: Finasteride Counseling:  I discussed with the patient the risks of use of finasteride including but not limited to decreased libido, decreased ejaculate volume, gynecomastia, and depression. Women should not handle medication.  All of the patient's questions and concerns were addressed.
Tazorac Counseling:  Patient advised that medication is irritating and drying.  Patient may need to apply sparingly and wash off after an hour before eventually leaving it on overnight.  The patient verbalized understanding of the proper use and possible adverse effects of tazorac.  All of the patient's questions and concerns were addressed.
Hydroxychloroquine Counseling:  I discussed with the patient that a baseline ophthalmologic exam is needed at the start of therapy and every year thereafter while on therapy. A CBC may also be warranted for monitoring.  The side effects of this medication were discussed with the patient, including but not limited to agranulocytosis, aplastic anemia, seizures, rashes, retinopathy, and liver toxicity. Patient instructed to call the office should any adverse effect occur.  The patient verbalized understanding of the proper use and possible adverse effects of Plaquenil.  All the patient's questions and concerns were addressed.
Erythromycin Counseling:  I discussed with the patient the risks of erythromycin including but not limited to GI upset, allergic reaction, drug rash, diarrhea, increase in liver enzymes, and yeast infections.
Elidel Counseling: Patient may experience a mild burning sensation during topical application. Elidel is not approved in children less than 2 years of age. There have been case reports of hematologic and skin malignancies in patients using topical calcineurin inhibitors although causality is questionable.
Sotyktu Counseling:  I discussed the most common side effects of Sotyktu including: common cold, sore throat, sinus infections, cold sores, canker sores, folliculitis, and acne.  I also discussed more serious side effects of Sotyktu including but not limited to: serious allergic reactions; increased risk for infections such as TB; cancers such as lymphomas; rhabdomyolysis and elevated CPK; and elevated triglycerides and liver enzymes. 
Olanzapine Pregnancy And Lactation Text: This medication is pregnancy category C.   There are no adequate and well controlled trials with olanzapine in pregnant females.  Olanzapine should be used during pregnancy only if the potential benefit justifies the potential risk to the fetus.   In a study in lactating healthy women, olanzapine was excreted in breast milk.  It is recommended that women taking olanzapine should not breast feed.
Klisyri Pregnancy And Lactation Text: It is unknown if this medication can harm a developing fetus or if it is excreted in breast milk.
Protopic Counseling: Patient may experience a mild burning sensation during topical application. Protopic is not approved in children less than 2 years of age. There have been case reports of hematologic and skin malignancies in patients using topical calcineurin inhibitors although causality is questionable.
Itraconazole Counseling:  I discussed with the patient the risks of itraconazole including but not limited to liver damage, nausea/vomiting, neuropathy, and severe allergy.  The patient understands that this medication is best absorbed when taken with acidic beverages such as non-diet cola or ginger ale.  The patient understands that monitoring is required including baseline LFTs and repeat LFTs at intervals.  The patient understands that they are to contact us or the primary physician if concerning signs are noted.
Topical Steroids Applications Pregnancy And Lactation Text: Most topical steroids are considered safe to use during pregnancy and lactation.  Any topical steroid applied to the breast or nipple should be washed off before breastfeeding.
Skyrizi Counseling: I discussed with the patient the risks of risankizumab-rzaa including but not limited to immunosuppression, and serious infections.  The patient understands that monitoring is required including a PPD at baseline and must alert us or the primary physician if symptoms of infection or other concerning signs are noted.
Oral Minoxidil Counseling- I discussed with the patient the risks of oral minoxidil including but not limited to shortness of breath, swelling of the feet or ankles, dizziness, lightheadedness, unwanted hair growth and allergic reaction.  The patient verbalized understanding of the proper use and possible adverse effects of oral minoxidil.  All of the patient's questions and concerns were addressed.
Protopic Pregnancy And Lactation Text: This medication is Pregnancy Category C. It is unknown if this medication is excreted in breast milk when applied topically.
Erythromycin Pregnancy And Lactation Text: This medication is Pregnancy Category B and is considered safe during pregnancy. It is also excreted in breast milk.
Prednisone Counseling:  I discussed with the patient the risks of prolonged use of prednisone including but not limited to weight gain, insomnia, osteoporosis, mood changes, diabetes, susceptibility to infection, glaucoma and high blood pressure.  In cases where prednisone use is prolonged, patients should be monitored with blood pressure checks, serum glucose levels and an eye exam.  Additionally, the patient may need to be placed on GI prophylaxis, PCP prophylaxis, and calcium and vitamin D supplementation and/or a bisphosphonate.  The patient verbalized understanding of the proper use and the possible adverse effects of prednisone.  All of the patient's questions and concerns were addressed.
Sski Pregnancy And Lactation Text: This medication is Pregnancy Category D and isn't considered safe during pregnancy. It is excreted in breast milk.
Cosentyx Counseling:  I discussed with the patient the risks of Cosentyx including but not limited to worsening of Crohn's disease, immunosuppression, allergic reactions and infections.  The patient understands that monitoring is required including a PPD at baseline and must alert us or the primary physician if symptoms of infection or other concerning signs are noted.
Sarecycline Counseling: Patient advised regarding possible photosensitivity and discoloration of the teeth, skin, lips, tongue and gums.  Patient instructed to avoid sunlight, if possible.  When exposed to sunlight, patients should wear protective clothing, sunglasses, and sunscreen.  The patient was instructed to call the office immediately if the following severe adverse effects occur:  hearing changes, easy bruising/bleeding, severe headache, or vision changes.  The patient verbalized understanding of the proper use and possible adverse effects of sarecycline.  All of the patient's questions and concerns were addressed.
Colchicine Counseling:  Patient counseled regarding adverse effects including but not limited to stomach upset (nausea, vomiting, stomach pain, or diarrhea).  Patient instructed to limit alcohol consumption while taking this medication.  Colchicine may reduce blood counts especially with prolonged use.  The patient understands that monitoring of kidney function and blood counts may be required, especially at baseline. The patient verbalized understanding of the proper use and possible adverse effects of colchicine.  All of the patient's questions and concerns were addressed.
Finasteride Pregnancy And Lactation Text: This medication is absolutely contraindicated during pregnancy. It is unknown if it is excreted in breast milk.
High Dose Vitamin A Counseling: Side effects reviewed, pt to contact office should one occur.
Infliximab Counseling:  I discussed with the patient the risks of infliximab including but not limited to myelosuppression, immunosuppression, autoimmune hepatitis, demyelinating diseases, lymphoma, and serious infections.  The patient understands that monitoring is required including a PPD at baseline and must alert us or the primary physician if symptoms of infection or other concerning signs are noted.
Bactrim Counseling:  I discussed with the patient the risks of sulfa antibiotics including but not limited to GI upset, allergic reaction, drug rash, diarrhea, dizziness, photosensitivity, and yeast infections.  Rarely, more serious reactions can occur including but not limited to aplastic anemia, agranulocytosis, methemoglobinemia, blood dyscrasias, liver or kidney failure, lung infiltrates or desquamative/blistering drug rashes.
Carac Counseling:  I discussed with the patient the risks of Carac including but not limited to erythema, scaling, itching, weeping, crusting, and pain.
Topical Sulfur Applications Counseling: Topical Sulfur Counseling: Patient counseled that this medication may cause skin irritation or allergic reactions.  In the event of skin irritation, the patient was advised to reduce the amount of the drug applied or use it less frequently.   The patient verbalized understanding of the proper use and possible adverse effects of topical sulfur application.  All of the patient's questions and concerns were addressed.
Albendazole Counseling:  I discussed with the patient the risks of albendazole including but not limited to cytopenia, kidney damage, nausea/vomiting and severe allergy.  The patient understands that this medication is being used in an off-label manner.
Minoxidil Counseling: Minoxidil is a topical medication which can increase blood flow where it is applied. It is uncertain how this medication increases hair growth. Side effects are uncommon and include stinging and allergic reactions.
Tazorac Pregnancy And Lactation Text: This medication is not safe during pregnancy. It is unknown if this medication is excreted in breast milk.
Sotyktu Pregnancy And Lactation Text: There is insufficient data to evaluate whether or not Sotyktu is safe to use during pregnancy.   It is not known if Sotyktu passes into breast milk and whether or not it is safe to use when breastfeeding.  
Erivedge Counseling- I discussed with the patient the risks of Erivedge including but not limited to nausea, vomiting, diarrhea, constipation, weight loss, changes in the sense of taste, decreased appetite, muscle spasms, and hair loss.  The patient verbalized understanding of the proper use and possible adverse effects of Erivedge.  All of the patient's questions and concerns were addressed.
Hydroxychloroquine Pregnancy And Lactation Text: This medication has been shown to cause fetal harm but it isn't assigned a Pregnancy Risk Category. There are small amounts excreted in breast milk.
Bactrim Pregnancy And Lactation Text: This medication is Pregnancy Category D and is known to cause fetal risk.  It is also excreted in breast milk.
Stelara Counseling:  I discussed with the patient the risks of ustekinumab including but not limited to immunosuppression, malignancy, posterior leukoencephalopathy syndrome, and serious infections.  The patient understands that monitoring is required including a PPD at baseline and must alert us or the primary physician if symptoms of infection or other concerning signs are noted.
Metronidazole Counseling:  I discussed with the patient the risks of metronidazole including but not limited to seizures, nausea/vomiting, a metallic taste in the mouth, nausea/vomiting and severe allergy.
Eucrisa Counseling: Patient may experience a mild burning sensation during topical application. Eucrisa is not approved in children less than 3 months of age.
Topical Sulfur Applications Pregnancy And Lactation Text: This medication is considered safe during pregnancy and breast feeding secondary to limited systemic absorption.
Oral Minoxidil Pregnancy And Lactation Text: This medication should only be used when clearly needed if you are pregnant, attempting to become pregnant or breast feeding.
Qbrexza Counseling:  I discussed with the patient the risks of Qbrexza including but not limited to headache, mydriasis, blurred vision, dry eyes, nasal dryness, dry mouth, dry throat, dry skin, urinary hesitation, and constipation.  Local skin reactions including erythema, burning, stinging, and itching can also occur.
Ketoconazole Counseling:   Patient counseled regarding improving absorption with orange juice.  Adverse effects include but are not limited to breast enlargement, headache, diarrhea, nausea, upset stomach, liver function test abnormalities, taste disturbance, and stomach pain.  There is a rare possibility of liver failure that can occur when taking ketoconazole. The patient understands that monitoring of LFTs may be required, especially at baseline. The patient verbalized understanding of the proper use and possible adverse effects of ketoconazole.  All of the patient's questions and concerns were addressed.
Zyclara Counseling:  I discussed with the patient the risks of imiquimod including but not limited to erythema, scaling, itching, weeping, crusting, and pain.  Patient understands that the inflammatory response to imiquimod is variable from person to person and was educated regarded proper titration schedule.  If flu-like symptoms develop, patient knows to discontinue the medication and contact us.
Cyclophosphamide Counseling:  I discussed with the patient the risks of cyclophosphamide including but not limited to hair loss, hormonal abnormalities, decreased fertility, abdominal pain, diarrhea, nausea and vomiting, bone marrow suppression and infection. The patient understands that monitoring is required while taking this medication.
Thalidomide Counseling: I discussed with the patient the risks of thalidomide including but not limited to birth defects, anxiety, weakness, chest pain, dizziness, cough and severe allergy.

## 2023-07-11 NOTE — PROCEDURE: PRESCRIPTION MEDICATION MANAGEMENT
Initiate Treatment: ketoconazole 2 % shampoo QD\\n: Wash affected areas on back with shampoo daily and leave on for 10-15 minutes. Afterwards, wash off in the shower.
Detail Level: Zone
Render In Strict Bullet Format?: No

## 2023-08-14 ENCOUNTER — LAB OUTSIDE AN ENCOUNTER (OUTPATIENT)
Dept: URBAN - METROPOLITAN AREA TELEHEALTH 2 | Facility: TELEHEALTH | Age: 63
End: 2023-08-14

## 2023-08-14 RX ORDER — KETOCONAZOLE 20 MG/ML
SHAMPOO, SUSPENSION TOPICAL QD
Qty: 120 | Refills: 4 | Status: ERX

## 2023-08-16 ENCOUNTER — LAB OUTSIDE AN ENCOUNTER (OUTPATIENT)
Dept: URBAN - METROPOLITAN AREA CLINIC 86 | Facility: CLINIC | Age: 63
End: 2023-08-16

## 2023-08-18 ENCOUNTER — LAB OUTSIDE AN ENCOUNTER (OUTPATIENT)
Dept: URBAN - METROPOLITAN AREA TELEHEALTH 2 | Facility: TELEHEALTH | Age: 63
End: 2023-08-18

## 2023-08-19 ENCOUNTER — TELEPHONE ENCOUNTER (OUTPATIENT)
Dept: URBAN - METROPOLITAN AREA CLINIC 86 | Facility: CLINIC | Age: 63
End: 2023-08-19

## 2023-08-19 LAB
A/G RATIO: 1.8
ABSOLUTE BASOPHILS: 29
ABSOLUTE EOSINOPHILS: 101
ABSOLUTE LYMPHOCYTES: 1930
ABSOLUTE MONOCYTES: 367
ABSOLUTE NEUTROPHILS: 4774
AFP, SERUM, TUMOR MARKER: 9.5
ALBUMIN: 4.3
ALKALINE PHOSPHATASE: 52
ALT (SGPT): 13
AST (SGOT): 21
BASOPHILS: 0.4
BILIRUBIN, TOTAL: 0.9
BUN/CREATININE RATIO: (no result)
BUN: 18
CALCIUM: 9.5
CARBON DIOXIDE, TOTAL: 26
CHLORIDE: 103
CREATININE: 1.05
EGFR: 80
EOSINOPHILS: 1.4
GLOBULIN, TOTAL: 2.4
GLUCOSE: 125
HBV LOG10: 2.44
HEMATOCRIT: 44.5
HEMOGLOBIN: 14.4
HEPATITIS B SURFACE ANTIGEN: REACTIVE
HEPATITIS B VIRUS DNA: 2.44
HEPATITIS B VIRUS DNA: 277
LYMPHOCYTES: 26.8
MCH: 28.7
MCHC: 32.4
MCV: 88.8
MONOCYTES: 5.1
MPV: 9.6
NEUTROPHILS: 66.3
PLATELET COUNT: 259
POTASSIUM: 3.9
PROTEIN, TOTAL: 6.7
RDW: 12.4
RED BLOOD CELL COUNT: 5.01
SODIUM: 140
WHITE BLOOD CELL COUNT: 7.2

## 2023-08-19 NOTE — HPI-TODAY'S VISIT:
Dear Johan White, Aug 16 afp 9.5 and 10.3 and 10.9 prior and good to see this dropping. HBV dna 277 and up from prior 121 and prior 40. Trend still less than 2000. Sugar 125 elevated and maybe not fasting. Bun 18 and cr 1.05 and na 140 and k 3.9 and cl 103 and co2 26 and ca 9.5 and alb 4.3 and tb 0.9 and alk 52 and ast 21 and alt 13.  Prior ast 23 and alt 14. Wbc 7.2 and hg 14.4 and hct 44.5 and mcv 88.8 and platelets 259. Hepatitis B surface antigen positive as expected. Will review at visit with you. Dr Sen

## 2023-08-22 ENCOUNTER — OFFICE VISIT (OUTPATIENT)
Dept: URBAN - METROPOLITAN AREA TELEHEALTH 2 | Facility: TELEHEALTH | Age: 63
End: 2023-08-22
Payer: COMMERCIAL

## 2023-08-22 VITALS — BODY MASS INDEX: 22.4 KG/M2 | WEIGHT: 160 LBS | HEIGHT: 71 IN

## 2023-08-22 DIAGNOSIS — R77.2 ELEVATED AFP: ICD-10-CM

## 2023-08-22 DIAGNOSIS — B18.1 CHRONIC HEPATITIS B: ICD-10-CM

## 2023-08-22 DIAGNOSIS — K76.89 LIVER CYST: ICD-10-CM

## 2023-08-22 DIAGNOSIS — K44.9 HIATAL HERNIA: ICD-10-CM

## 2023-08-22 DIAGNOSIS — R89.8 ELEVATED TUMOR MARKERS: ICD-10-CM

## 2023-08-22 DIAGNOSIS — K63.5 COLON POLYP: ICD-10-CM

## 2023-08-22 PROCEDURE — 99214 OFFICE O/P EST MOD 30 MIN: CPT

## 2023-08-22 NOTE — HPI-TODAY'S VISIT:
Patient is a 63-year-old male last seen April 2023 and referred for evaluation of hepatitis B issues.  A copy of the note will be sent to the referring provider.  Saw u.s but not labs. In town so can get that done.  Aug 16 afp 9.5 and 10.3 and 10.9 prior and good to see this dropping. HBV dna 277 and up from prior 121 and prior 40. Trend still less than 2000. Sugar 125 elevated and maybe not fasting. Bun 18 and cr 1.05 and na 140 and k 3.9 and cl 103 and co2 26 and ca 9.5 and alb 4.3 and tb 0.9 and alk 52 and ast 21 and alt 13.  Prior ast 23 and alt 14. Wbc 7.2 and hg 14.4 and hct 44.5 and mcv 88.8 and platelets 259. Hepatitis B surface antigen positive as expected.  He did the u.s from [facility].   April 11th ultrasound shows liver to be normal in size/contour with top normal echogenicity.  They did see a 1.2 x 1.3 cm simple subcapsular right hepatic lobe cyst that is stable to minimally increased in size.  No suspicious liver lesions seen. Gallbladder unremarkable with no stones. Common bile duct normal at 2.8 mm. Imaged pancreas portions unremarkable with tail partially obscured by gas. Right kidney 10.9 cm with no hydronephrosis. Spleen measures up to 11.2 cm with no suspicious lesions. Liver and splenic vessels patent. In summary no evidence of any overt cirrhotic morphology or suspicious liver lesions and a 1.3 cm subcapsular right hepatic lobe cyst that stable to slightly increased in size. We will review this further at your visit that is coming up.  Dec 27 2022  u.s shows pancreas not able to be seen due overlying bowel gas. Liver was normal in size and smooth in contour and mildly echogenic but without significant change versus May 2022 u.s.  They felt it represented a mild degree of fatty infiltration. They stated also that it remained mildly heterogeneous and without significant change. A 9.7 x 7.8 x 10.1 mm cyst was seen in the peripheral and posterior aspect of segment 7 and it was slightly larger but otherwise stable. No gallstones were seen in the gallbladder.  No bile duct dilation was seen.  Common hepatic bile duct was normal at 3.6 mm.  They could not see the more distal common bile duct due to gas as well. Right kidney was 10.6 cm. Spleen was 9.4 cm. Liver vessels were patent as expected. Overall they felt the liver still had mild fatty changes and the tiny cyst was slightly larger but was otherwise stable.  No gallstones were seen.  The spleen was normal sized at 9.4 cm. Given these findings and ecxam limits and the AFP remaining elevated at 10.3, we would recommend that you redo the ultrasound and labs again in the timeframe of April.  We can also change your telehealth appointment to that timeframe.  We will ask Eileen at [phone] ext [phone] to reach out to you to set this up.   December 16th labs are back.  The complete blood count including the hemoglobin MCV, and platelets are normal.  The complete metabolic panel shows the creatinine is 1.14, sodium 144, potassium 4.3, bilirubin 0.8, alkaline phosphatase 55, AST 23, ALT 14 previously and in April the AST was 30 and the ALT was 17 so these are better.  The hepatitis B virus level is 121 so less than 2000.  Surface antigen is reactive.  The AFP was 10.3 previously in April it was 10.9.  We will see what the ultrasound shows us and as discussed we may need more advanced imaging.   My office staff was able to get the May 27 AHI report sent in. There was some motion limitations on the scan. Lower chest visualized area showed no significant hilar effusion at the lung bases. The liver was within normal limits for size with the right lobe measuring 16.5 cm. No significant liver fat was seen. There are a few small T2 hyperintense and T1 hypointense hepatic cyst seen measuring 1.4 cm in the posterior right lobe and 0.8 cm near the gallbladder fossa. Several other scattered additional subcentimeter T2 hyperintense foci likely hepatic cysts and/or biliary hamartomas were seen throughout the liver. No suspicious enhancing liver lesions were seen. Liver fat was normal at 5%. The gallbladder was unremarkable. No intra or extrahepatic bile duct dilation was seen. Spleen was normal with regards to size. Adrenal glands were unremarkable. Pancreas was unremarkable. Kidneys showed no hydronephrosis but there is a 1.5 cm cyst seen in the left kidney. Liver vessels were patent. They did see scattered colon diverticuli in the visualized colon but no signs of diverticulitis. Overall they felt that you had no significant liver fat but they did see multiple small hepatic cysts and/or biliary hamartomas.  No suspicious liver lesions were seen.   May 3 ultrasound shows the right upper quadrant study to show no gallstones in the gallbladder. Common bile duct normal at 4 mm. Liver however appeared to be coarsened in regards to the echotexture to them. A 9 mm right lobe liver cyst was seen. The pancreas was not well seen due to gas. The right kidney measured 11.1 cm. Spleen measured 10.1 cm. Liver vessels were patent. They were concerned about the liver coarsening as to possible underlying fibrosis and/or cirrhosis risk. Typically we would recommend to do an MRI to assess this further. I looked back at notes regarding prior studies for you and when I looked at the last ultrasound in 2017 it did not mention any coarsening but it did mention the 1.5 x 1.1 x 1.2 cm cyst of the liver. The last October 2017 MRI mentioned a cyst but supposedly the liver was otherwise unremarkable. As you recall from your recent lab review hep B DNA remains low at 40 and the AST 30 and ALT of 17 and the platelet count normal. I tried to call you to discuss the results but could not get you. I will send this via portal to you and if you agree to do the mri we will order it to be done now to be sure. We look forward to hearing back from you.  April 25 labs show hepatitis A immunity is not seen and you should consider getting the hepatitis A vaccine series.  It is a series of 2 doses and can prevent you against getting hepatitis A on top of your current chronic hepatitis issues which would have more risk to cause issues in you versus if you did not have the chronic hepatitis. AFP remains elevated slightly at 10.9 normal being less than 8.4. Hepatitis B E antigen is negative hepatitis B E antibody is positive.  This puts you in a lower replicative state.  Very common to see at your age. Hepatitis B DNA is low at 40.  By itself not meeting criteria for treatment as hbv B dna is less than 2000. Glucose 93 BUN of 14 creatinine 1.10 sodium 139 potassium 4.3 calcium 9.4 albumin 4.8 bilirubin 0.8 alkaline phosphatase 55 AST 30 and ALT 17.  Ideal ALT is less than 35. White blood cell count 6.0 hemoglobin 13.9 platelet count 284 MCV 87 and neutrophils 3.2 lymphocytes 2.1. I would normally recommend with these labs to do the ultrasound imaging and blood work every 4 months and to do a more advanced scan if the ultrasound has any issues noted.  I am still awaiting the ultrasound imaging.   Records review: Patient previously had seen Dr. Baez here back in 2016. Not much of that info.  Recently had been seen by Dr. Ozzie Scott of digestive [facility].  Please see a note here from November 6, 2018 that mentions that the patient has chronic hep B and postprandial belching and bloating.  Ultrasound ordered for liver screening was not done.  EGD and colon was also not done.  Patient apparently had issues getting the ultrasound done at [facility].  He was reported to have low viral load and liver enzymes that were negative and E antibody positive state.    They stressed the importance of screening.  Same 165 still.  Weight at that time was 165 pounds height of 5 foot 10 BMI 23.68.    December 7, 2016 labs show white blood cell count 7.5 hemoglobin 14.2 glucose 104 BUN of 16 creatinine 1.18 albumin 4.5 AST 26 ALT 15 alkaline phosphatase 55 bilirubin 0.6.  July 22, 2015 colonoscopy showed 4 polyps in the right colon ranging from 3 to 5 mm removed with hot snare and 2 polyps in the transverse colon ranging from 2 to 3 mm removed with cold snare and sigmoid diverticulosis and internal hemorrhoids.  Last colon 2019 and only one polyp then.  Pathology from the right colon polypectomy show fragments of tubular adenoma and fragments of hyperplastic polyp and submucosal lipoma transverse colon polyp was hyperplastic and tubular adenoma.  May 2016 B surface antigen positive E antibody positive viral load 8 await.  January 2017 labs showed AFP elevated 15.3 viral load 480 AST 31.  March 2017 ultrasound mentions pancreas poorly seen no definite abnormality liver was remarkable for a mildly complicated 1.5 x 1.1 x 1.2 cm cyst in the inferior aspect and no gallstones.  October 2017 MRI showed small cyst in the posterior segment the right liver otherwise unremarkable. They recommended the patient to have tumor screening and that he could potentially do alternating ultrasound with MRI.  They wanted to see him in 6 months.  He has not done any more since then.  Pt says he went to donate blood and he is from [place].  He says had letter then. He saw specialist then and he was 17 and told not to worry.  He was seeing Dr Baez and Dr Scott for gi issues and then mentioned this and led to the work up.  Do not see any notes from that timeframe but do see notes from earlier in April 2018 where again he was being seen for the hepatitis B and labs from September 2017 showed AST 27 ALT 16 total bili 0.8 in May 2016 labs reference viral load 8 await.  December 2016 labs show white count 7.5 hemoglobin 14.2 creatinine 1.0 AST 31 ALT 21  Past medical history chronic hep B, colon polyps, back pain.  Past surgeries include colonoscopy 2015, nasal septal surgery, fractured left wrist.  Social history not a smoker but occasional social alcohol.  Father with prostate cancer and kidney stones.  Mother with thyroid disease.  We did see a hep B panel (from November 6, 2018 a level of 300 hep B April 2018 for 50 September 2017 230 ALT over the same timeframe have been 16, 15, and 16.  We did confirm that January 9, 2017 AFP that was elevated at 15.3 and normal was less than 8.3.  We saw an EGD / colon from October 2019 showed a small hiatal hernia mild gastritis and a 5 mm ascending colon polyp and diverticulosis.  Duodenum showed no significant histologic changes stomach showed no H. pylori and ascending colon showed vegetable matter.  Need to caught on labs and u.s.  Plan 1.  Plan to check on the scan that he did. 2.  Pt will do labs in 4m and then 4m. 3.  Pt will do telemed in 4m to review with him.   Duration of the visit was 33 minutes with 10 minutes of chart prep and 23 minutes for this telemed healow visit today with time reviewing their prior and recent records and labs and discussing their current status and future plans for care for the patient.

## 2023-12-11 ENCOUNTER — TELEPHONE ENCOUNTER (OUTPATIENT)
Dept: URBAN - METROPOLITAN AREA CLINIC 86 | Facility: CLINIC | Age: 63
End: 2023-12-11

## 2023-12-18 ENCOUNTER — LAB OUTSIDE AN ENCOUNTER (OUTPATIENT)
Dept: URBAN - METROPOLITAN AREA TELEHEALTH 2 | Facility: TELEHEALTH | Age: 63
End: 2023-12-18

## 2023-12-30 ENCOUNTER — TELEPHONE ENCOUNTER (OUTPATIENT)
Dept: URBAN - METROPOLITAN AREA CLINIC 86 | Facility: CLINIC | Age: 63
End: 2023-12-30

## 2023-12-30 LAB
A/G RATIO: 1.7
ABSOLUTE BASOPHILS: 33
ABSOLUTE EOSINOPHILS: 156
ABSOLUTE LYMPHOCYTES: 2107
ABSOLUTE MONOCYTES: 730
ABSOLUTE NEUTROPHILS: 5174
AFP, SERUM, TUMOR MARKER: 9.7
ALBUMIN: 4.4
ALKALINE PHOSPHATASE: 56
ALT (SGPT): 16
AST (SGOT): 22
BASOPHILS: 0.4
BILIRUBIN, TOTAL: 0.8
BUN/CREATININE RATIO: (no result)
BUN: 16
CALCIUM: 9.8
CARBON DIOXIDE, TOTAL: 31
CHLORIDE: 103
CREATININE: 1.08
EGFR: 78
EOSINOPHILS: 1.9
GLOBULIN, TOTAL: 2.6
GLUCOSE: 87
HBV LOG10: 2.7
HEMATOCRIT: 42.7
HEMOGLOBIN: 14.4
HEPATITIS B VIRUS DNA: 504
LYMPHOCYTES: 25.7
MCH: 29.6
MCHC: 33.7
MCV: 87.9
MONOCYTES: 8.9
MPV: 9.4
NEUTROPHILS: 63.1
PLATELET COUNT: 273
POTASSIUM: 4.5
PROTEIN, TOTAL: 7
RDW: 12.7
RED BLOOD CELL COUNT: 4.86
SODIUM: 144
WHITE BLOOD CELL COUNT: 8.2

## 2023-12-30 NOTE — HPI-TODAY'S VISIT:
Dear Johan White,  Dec 28 labs show HBV dna 504 up from 277 and prior 121 so staying less than 2000. Afp elevated though 9.7 from 9.5 and prior 10.3. Glucose 87 and bun 16 and cr 1.08 and na 144 and k 4.5 and cl 103 and co2 31 and ca 9.8 and total protein 7.0. Total bili 0.8 and alk 56 and ast 22 and alt 16 and prior ast 21 and alt 13. So staying ideal alt less than 35. Wbc 8.2 and hg 14.4 and hct 42.7 and platelets 273. Mcv 87.9 normal. Normal neutrophils and lymphocytes. Lets discuss again the pros and cons of trying to control the hep b with meds and see if the afp normalizes with that at the visit next week. While not a usual indication, when done, I have seen it help some in this circumstance before. Dr Sen

## 2024-01-03 ENCOUNTER — OFFICE VISIT (OUTPATIENT)
Dept: URBAN - METROPOLITAN AREA TELEHEALTH 2 | Facility: TELEHEALTH | Age: 64
End: 2024-01-03
Payer: COMMERCIAL

## 2024-01-03 VITALS — HEIGHT: 71 IN | BODY MASS INDEX: 22.4 KG/M2 | WEIGHT: 160 LBS

## 2024-01-03 DIAGNOSIS — K44.9 HIATAL HERNIA: ICD-10-CM

## 2024-01-03 DIAGNOSIS — R89.8 ELEVATED TUMOR MARKERS: ICD-10-CM

## 2024-01-03 DIAGNOSIS — K76.89 LIVER CYST: ICD-10-CM

## 2024-01-03 DIAGNOSIS — R77.2 ELEVATED AFP: ICD-10-CM

## 2024-01-03 DIAGNOSIS — B18.1 CHRONIC HEPATITIS B: ICD-10-CM

## 2024-01-03 PROCEDURE — 99214 OFFICE O/P EST MOD 30 MIN: CPT

## 2024-01-03 NOTE — HPI-TODAY'S VISIT:
Patient is a 63-year-old male last seen Aug 2023 and referred for evaluation of hepatitis B issues.  A copy of the note will be sent to the referring provider.  Dec 28 labs show HBV dna 504 up from 277 and prior 121 so staying less than 2000. Afp elevated though 9.7 from 9.5 and prior 10.3. Glucose 87 and bun 16 and cr 1.08 and na 144 and k 4.5 and cl 103 and co2 31 and ca 9.8 and total protein 7.0. Total bili 0.8 and alk 56 and ast 22 and alt 16 and prior ast 21 and alt 13. So staying ideal alt less than 35. Wbc 8.2 and hg 14.4 and hct 42.7 and platelets 273. Mcv 87.9 normal. Normal neutrophils and lymphocytes. Lets discuss again the pros and cons of trying to control the hep b with meds and see if the afp normalizes with that at the visit next week. While not a usual indication, when done, I have seen it help some in this circumstance before.   Dec 5 u.s sent to me today. Liver had a normal echotexture, contour and size. The previously seen small hepatic cyst from your MRI of August 17, 2023 were not able to be identified on the ultrasound. Patent vessels were seen. Normal gallbladder was seen with a nondilated common bile duct measuring 2 mm noted. A 6 mm simple cyst seen in the interpolar right kidney and a 1.9 cm simple cyst seen in the interpolar left kidney. Spleen was normal overall. Normal imaged portions of the pancreas were noted. In summary, no significant abnormality of the imaged inferior vena cava or aorta was seen. We will review this further with you at your upcoming visit of January 3.   Aug 16 afp 9.5 and 10.3 and 10.9 prior and good to see this dropping. HBV dna 277 and up from prior 121 and prior 40. Trend still less than 2000. Sugar 125 elevated and maybe not fasting. Bun 18 and cr 1.05 and na 140 and k 3.9 and cl 103 and co2 26 and ca 9.5 and alb 4.3 and tb 0.9 and alk 52 and ast 21 and alt 13.  Prior ast 23 and alt 14. Wbc 7.2 and hg 14.4 and hct 44.5 and mcv 88.8 and platelets 259. Hepatitis B surface antigen positive as expected.  Aug 17 ahi mri: heart was wnl. No sig effusion. Liver was wnl in size and right lobe 16.5cm and no significant fat. Small cysts stable. Gave the size 1.4cm stab.e.  smaller cysts and or hamartomas.  No suspicious mass. Liver fat 2 and down from 5%. NO gallstones.  Spleen normal. No hydronephrosis and a few cysts in kidneys. Vessels open.  Diverticula off the colon.  April 11th ultrasound shows liver to be normal in size/contour with top normal echogenicity.  They did see a 1.2 x 1.3 cm simple subcapsular right hepatic lobe cyst that is stable to minimally increased in size.  No suspicious liver lesions seen. Gallbladder unremarkable with no stones. Common bile duct normal at 2.8 mm. Imaged pancreas portions unremarkable with tail partially obscured by gas. Right kidney 10.9 cm with no hydronephrosis. Spleen measures up to 11.2 cm with no suspicious lesions. Liver and splenic vessels patent. In summary no evidence of any overt cirrhotic morphology or suspicious liver lesions and a 1.3 cm subcapsular right hepatic lobe cyst that stable to slightly increased in size. We will review this further at your visit that is coming up.  Dec 27 2022  u.s shows pancreas not able to be seen due overlying bowel gas. Liver was normal in size and smooth in contour and mildly echogenic but without significant change versus May 2022 u.s.  They felt it represented a mild degree of fatty infiltration. They stated also that it remained mildly heterogeneous and without significant change. A 9.7 x 7.8 x 10.1 mm cyst was seen in the peripheral and posterior aspect of segment 7 and it was slightly larger but otherwise stable. No gallstones were seen in the gallbladder.  No bile duct dilation was seen.  Common hepatic bile duct was normal at 3.6 mm.  They could not see the more distal common bile duct due to gas as well. Right kidney was 10.6 cm. Spleen was 9.4 cm. Liver vessels were patent as expected. Overall they felt the liver still had mild fatty changes and the tiny cyst was slightly larger but was otherwise stable.  No gallstones were seen.  The spleen was normal sized at 9.4 cm. Given these findings and ecxam limits and the AFP remaining elevated at 10.3, we would recommend that you redo the ultrasound and labs again in the timeframe of April.  We can also change your telehealth appointment to that timeframe.  We will ask Eileen at [phone] to reach out to you to set this up.   December 16th 2022 labs are back.  The complete blood count including the hemoglobin MCV, and platelets are normal.  The complete metabolic panel shows the creatinine is 1.14, sodium 144, potassium 4.3, bilirubin 0.8, alkaline phosphatase 55, AST 23, ALT 14 previously and in April the AST was 30 and the ALT was 17 so these are better.  The hepatitis B virus level is 121 so less than 2000.  Surface antigen is reactive.  The AFP was 10.3 previously in April it was 10.9.  We will see what the ultrasound shows us and as discussed we may need more advanced imaging.   My office staff was able to get the May 27 [facility] report sent in. There was some motion limitations on the scan. Lower chest visualized area showed no significant hilar effusion at the lung bases. The liver was within normal limits for size with the right lobe measuring 16.5 cm. No significant liver fat was seen. There are a few small T2 hyperintense and T1 hypointense hepatic cyst seen measuring 1.4 cm in the posterior right lobe and 0.8 cm near the gallbladder fossa. Several other scattered additional subcentimeter T2 hyperintense foci likely hepatic cysts and/or biliary hamartomas were seen throughout the liver. No suspicious enhancing liver lesions were seen. Liver fat was normal at 5%. The gallbladder was unremarkable. No intra or extrahepatic bile duct dilation was seen. Spleen was normal with regards to size. Adrenal glands were unremarkable. Pancreas was unremarkable. Kidneys showed no hydronephrosis but there is a 1.5 cm cyst seen in the left kidney. Liver vessels were patent. They did see scattered colon diverticuli in the visualized colon but no signs of diverticulitis. Overall they felt that you had no significant liver fat but they did see multiple small hepatic cysts and/or biliary hamartomas.  No suspicious liver lesions were seen.   May 3 ultrasound shows the right upper quadrant study to show no gallstones in the gallbladder. Common bile duct normal at 4 mm. Liver however appeared to be coarsened in regards to the echotexture to them. A 9 mm right lobe liver cyst was seen. The pancreas was not well seen due to gas. The right kidney measured 11.1 cm. Spleen measured 10.1 cm. Liver vessels were patent. They were concerned about the liver coarsening as to possible underlying fibrosis and/or cirrhosis risk. Typically we would recommend to do an MRI to assess this further. I looked back at notes regarding prior studies for you and when I looked at the last ultrasound in 2017 it did not mention any coarsening but it did mention the 1.5 x 1.1 x 1.2 cm cyst of the liver. The last October 2017 MRI mentioned a cyst but supposedly the liver was otherwise unremarkable. As you recall from your recent lab review hep B DNA remains low at 40 and the AST 30 and ALT of 17 and the platelet count normal. I tried to call you to discuss the results but could not get you. I will send this via portal to you and if you agree to do the mri we will order it to be done now to be sure. We look forward to hearing back from you.  April 25 labs show hepatitis A immunity is not seen and you should consider getting the hepatitis A vaccine series.  It is a series of 2 doses and can prevent you against getting hepatitis A on top of your current chronic hepatitis issues which would have more risk to cause issues in you versus if you did not have the chronic hepatitis. AFP remains elevated slightly at 10.9 normal being less than 8.4. Hepatitis B E antigen is negative hepatitis B E antibody is positive.  This puts you in a lower replicative state.  Very common to see at your age. Hepatitis B DNA is low at 40.  By itself not meeting criteria for treatment as hbv B dna is less than 2000. Glucose 93 BUN of 14 creatinine 1.10 sodium 139 potassium 4.3 calcium 9.4 albumin 4.8 bilirubin 0.8 alkaline phosphatase 55 AST 30 and ALT 17.  Ideal ALT is less than 35. White blood cell count 6.0 hemoglobin 13.9 platelet count 284 MCV 87 and neutrophils 3.2 lymphocytes 2.1. I would normally recommend with these labs to do the ultrasound imaging and blood work every 4 months and to do a more advanced scan if the ultrasound has any issues noted.  I am still awaiting the ultrasound imaging.   Records review: Patient previously had seen Dr. Baez here back in 2016. Not much of that info.  Recently had been seen by Dr. Ozzie Scott of [facility].  Please see a note here from November 6, 2018 that mentions that the patient has chronic hep B and postprandial belching and bloating.  Ultrasound ordered for liver screening was not done.  EGD and colon was also not done.  Patient apparently had issues getting the ultrasound done at [facility].  He was reported to have low viral load and liver enzymes that were negative and E antibody positive state.    They stressed the importance of screening.  Same 165 still.  Weight at that time was 165 pounds height of 5 foot 10 BMI 23.68.    December 7, 2016 labs show white blood cell count 7.5 hemoglobin 14.2 glucose 104 BUN of 16 creatinine 1.18 albumin 4.5 AST 26 ALT 15 alkaline phosphatase 55 bilirubin 0.6.  July 22, 2015 colonoscopy showed 4 polyps in the right colon ranging from 3 to 5 mm removed with hot snare and 2 polyps in the transverse colon ranging from 2 to 3 mm removed with cold snare and sigmoid diverticulosis and internal hemorrhoids.  Last colon 2019 and only one polyp then.  Pathology from the right colon polypectomy show fragments of tubular adenoma and fragments of hyperplastic polyp and submucosal lipoma transverse colon polyp was hyperplastic and tubular adenoma.  May 2016 B surface antigen positive E antibody positive viral load 8 await.  January 2017 labs showed AFP elevated 15.3 viral load 480 AST 31.  March 2017 ultrasound mentions pancreas poorly seen no definite abnormality liver was remarkable for a mildly complicated 1.5 x 1.1 x 1.2 cm cyst in the inferior aspect and no gallstones.  October 2017 MRI showed small cyst in the posterior segment the right liver otherwise unremarkable. They recommended the patient to have tumor screening and that he could potentially do alternating ultrasound with MRI.  They wanted to see him in 6 months.  He has not done any more since then.  Pt says he went to donate blood and he is from [place].  He says had letter then. He saw specialist then and he was 17 and told not to worry.  He was seeing Dr Baez and Dr Scott for gi issues and then mentioned this and led to the work up.  Do not see any notes from that timeframe but do see notes from earlier in April 2018 where again he was being seen for the hepatitis B and labs from September 2017 showed AST 27 ALT 16 total bili 0.8 in May 2016 labs reference viral load 8 await.  December 2016 labs show white count 7.5 hemoglobin 14.2 creatinine 1.0 AST 31 ALT 21  Past medical history chronic hep B, colon polyps, back pain.  Past surgeries include colonoscopy 2015, nasal septal surgery, fractured left wrist.  Social history not a smoker but occasional social alcohol.  Father with prostate cancer and kidney stones.  Mother with thyroid disease.  We did see a hep B panel (from November 6, 2018 a level of 300 hep B April 2018 for 50 September 2017 230 ALT over the same timeframe have been 16, 15, and 16.  We did confirm that January 9, 2017 AFP that was elevated at 15.3 and normal was less than 8.3.  We saw an EGD / colon from October 2019 showed a small hiatal hernia mild gastritis and a 5 mm ascending colon polyp and diverticulosis.  Duodenum showed no significant histologic changes stomach showed no H. pylori and ascending colon showed vegetable matter.  Need to caught on labs and u.s.  Plan 1. Talked with pt re the hbv dna needs to be watched and afp remains up and follow the course. 2. Pt will do u.s again in March 2024 3. DIscussed possible hep b tx for the inc afp but he declines to consider. 4. Pt will see us in 4m.   Duration of the visit was 35 minutes with 10 minutes of chart prep and 25 minutes for this telemed healow visit today with time reviewing their prior and recent records and labs and discussing their current status and future plans for care for the patient.

## 2024-03-25 ENCOUNTER — LAB (OUTPATIENT)
Dept: URBAN - METROPOLITAN AREA TELEHEALTH 2 | Facility: TELEHEALTH | Age: 64
End: 2024-03-25

## 2024-04-04 ENCOUNTER — TELEP (OUTPATIENT)
Dept: URBAN - METROPOLITAN AREA TELEHEALTH 2 | Facility: TELEHEALTH | Age: 64
End: 2024-04-04

## 2024-04-04 VITALS — WEIGHT: 160 LBS | BODY MASS INDEX: 22.4 KG/M2 | HEIGHT: 71 IN

## 2024-07-18 ENCOUNTER — APPOINTMENT (RX ONLY)
Dept: URBAN - METROPOLITAN AREA OTHER 4 | Facility: OTHER | Age: 64
Setting detail: DERMATOLOGY
End: 2024-07-18

## 2024-07-18 DIAGNOSIS — B36.0 PITYRIASIS VERSICOLOR: ICD-10-CM | Status: STABLE

## 2024-07-18 DIAGNOSIS — L73.8 OTHER SPECIFIED FOLLICULAR DISORDERS: ICD-10-CM

## 2024-07-18 DIAGNOSIS — L82.1 OTHER SEBORRHEIC KERATOSIS: ICD-10-CM

## 2024-07-18 DIAGNOSIS — L60.3 NAIL DYSTROPHY: ICD-10-CM

## 2024-07-18 DIAGNOSIS — L81.5 LEUKODERMA, NOT ELSEWHERE CLASSIFIED: ICD-10-CM

## 2024-07-18 DIAGNOSIS — L57.8 OTHER SKIN CHANGES DUE TO CHRONIC EXPOSURE TO NONIONIZING RADIATION: ICD-10-CM

## 2024-07-18 PROCEDURE — ? SUNSCREEN RECOMMENDATIONS

## 2024-07-18 PROCEDURE — 99213 OFFICE O/P EST LOW 20 MIN: CPT

## 2024-07-18 PROCEDURE — ? PRESCRIPTION

## 2024-07-18 PROCEDURE — ? COUNSELING

## 2024-07-18 PROCEDURE — ? PRESCRIPTION MEDICATION MANAGEMENT

## 2024-07-18 RX ORDER — KETOCONAZOLE 20 MG/G
CREAM TOPICAL
Qty: 60 | Refills: 2 | Status: ERX | COMMUNITY
Start: 2024-07-18

## 2024-07-18 RX ADMIN — KETOCONAZOLE: 20 CREAM TOPICAL at 00:00

## 2024-07-18 ASSESSMENT — LOCATION SIMPLE DESCRIPTION DERM
LOCATION SIMPLE: LEFT CHEEK
LOCATION SIMPLE: NOSE
LOCATION SIMPLE: LEFT UPPER BACK
LOCATION SIMPLE: RIGHT PRETIBIAL REGION
LOCATION SIMPLE: LEFT THUMB
LOCATION SIMPLE: LEFT PRETIBIAL REGION
LOCATION SIMPLE: SUPERIOR FOREHEAD
LOCATION SIMPLE: RIGHT ANTERIOR NECK

## 2024-07-18 ASSESSMENT — LOCATION ZONE DERM
LOCATION ZONE: NOSE
LOCATION ZONE: FINGERNAIL
LOCATION ZONE: LEG
LOCATION ZONE: NECK
LOCATION ZONE: TRUNK
LOCATION ZONE: FACE

## 2024-07-18 ASSESSMENT — LOCATION DETAILED DESCRIPTION DERM
LOCATION DETAILED: SUPERIOR MID FOREHEAD
LOCATION DETAILED: LEFT INFERIOR CENTRAL MALAR CHEEK
LOCATION DETAILED: LEFT MID-UPPER BACK
LOCATION DETAILED: RIGHT DISTAL PRETIBIAL REGION
LOCATION DETAILED: RIGHT INFERIOR ANTERIOR NECK
LOCATION DETAILED: LEFT THUMBNAIL
LOCATION DETAILED: NASAL DORSUM
LOCATION DETAILED: LEFT PROXIMAL PRETIBIAL REGION

## 2024-07-18 ASSESSMENT — BSA RASH: BSA RASH: 1

## 2024-07-18 NOTE — PROCEDURE: PRESCRIPTION MEDICATION MANAGEMENT
Render In Strict Bullet Format?: No
Detail Level: Zone
Initiate Treatment: ketoconazole 2 % topical cream \\nQuantity: 60.0 g\\nSig: Apply to rash affected areas on the body twice daily for flares. Use as needed daily for ongoing maintenance.

## 2024-07-19 RX ORDER — KETOCONAZOLE 20 MG/G
CREAM TOPICAL
Qty: 15 | Refills: 2 | Status: ERX

## 2024-07-19 RX ORDER — KETOCONAZOLE 20 MG/ML
SHAMPOO, SUSPENSION TOPICAL QD
Qty: 120 | Refills: 4 | Status: ERX | COMMUNITY
Start: 2024-07-19

## 2024-07-19 RX ADMIN — KETOCONAZOLE: 20 SHAMPOO, SUSPENSION TOPICAL at 00:00

## 2024-07-23 ENCOUNTER — OFFICE VISIT (OUTPATIENT)
Dept: URBAN - METROPOLITAN AREA TELEHEALTH 2 | Facility: TELEHEALTH | Age: 64
End: 2024-07-23

## 2024-07-23 NOTE — HPI-TODAY'S VISIT:
Patient is a 64-year-old male last seen jan 2024  and referred for evaluation of hepatitis B issues. A copy of the note will be sent to the referring provider.  Dear Johan White,   April 1 ultrasound back. Liver, gallbladder, common bile duct, observed pancreas portions, spleen, aorta and vena cava were within normal limits. The did mention that you had a 15 mm posterior right hepatic cyst present. Aorta was 2.2 cm proximally, 1.5 cm in the mid aspect and 1.4 cm distally. Right kidney was 12 cm and left kidney 12 cm with the left kidney having a 1.7 cm cyst. In summary, they felt there was no suspicious mass seen. December 5 ultrasound previously mentioned that you had a small hepatic cyst which was also seen. Left kidney had a 1.9 cm cyst by measurement last time so this was within the variability that he can sometimes se important for you to do imaging every 4 months if the AFP remains elevated and that can be slowed down to every 6 months if it normalizes. Awaiting the labs. Dr. Sen Dec 28 labs show HBV dna 504 up from 277 and prior 121 so staying less than 2000. Afp elevated though 9.7 from 9.5 and prior 10.3. Glucose 87 and bun 16 and cr 1.08 and na 144 and k 4.5 and cl 103 and co2 31 and ca 9.8 and total protein 7.0. Total bili 0.8 and alk 56 and ast 22 and alt 16 and prior ast 21 and alt 13. So staying ideal alt less than 35. Wbc 8.2 and hg 14.4 and hct 42.7 and platelets 273. Mcv 87.9 normal. Normal neutrophils and lymphocytes. Lets discuss again the pros and cons of trying to control the hep b with meds and see if the afp normalizes with that at the visit next week. While not a usual indication, when done, I have seen it help some in this circumstance before. Dec 5 u.s sent to me today. Liver had a normal echotexture, contour and size. The previously seen small hepatic cyst from your MRI of August 17, 2023 were not able to be identified on the ultrasound. Patent vessels were seen. Normal gallbladder was seen with a nondilated common bile duct measuring 2 mm noted. A 6 mm simple cyst seen in the interpolar right kidney and a 1.9 cm simple cyst seen in the interpolar left kidney. Spleen was normal overall. Normal imaged portions of the pancreas were noted. In summary, no significant abnormality of the imaged inferior vena cava or aorta was seen. We will review this further with you at your upcoming visit of January 3. Aug 16 afp 9.5 and 10.3 and 10.9 prior and good to see this dropping. HBV dna 277 and up from prior 121 and prior 40. Trend still less than 2000. Sugar 125 elevated and maybe not fasting. Bun 18 and cr 1.05 and na 140 and k 3.9 and cl 103 and co2 26 and ca 9.5 and alb 4.3 and tb 0.9 and alk 52 and ast 21 and alt 13. Prior ast 23 and alt 14. Wbc 7.2 and hg 14.4 and hct 44.5 and mcv 88.8 and platelets 259. Hepatitis B surface antigen positive as expected. Aug 17 ahi mri: heart was wnl. No sig effusion. Liver was wnl in size and right lobe 16.5cm and no significant fat. Small cysts stable. Gave the size 1.4cm stab.e. smaller cysts and or hamartomas. No suspicious mass. Liver fat 2 and down from 5%. NO gallstones. Spleen normal. No hydronephrosis and a few cysts in kidneys. Vessels open. Diverticula off the colon. April 11th ultrasound shows liver to be normal in size/contour with top normal echogenicity. They did see a 1.2 x 1.3 cm simple subcapsular right hepatic lobe cyst that is stable to minimally increased in size. No suspicious liver lesions seen. Gallbladder unremarkable with no stones. Common bile duct normal at 2.8 mm. Imaged pancreas portions unremarkable with tail partially obscured by gas. Right kidney 10.9 cm with no hydronephrosis. Spleen measures up to 11.2 cm with no suspicious lesions. Liver and splenic vessels patent. In summary no evidence of any overt cirrhotic morphology or suspicious liver lesions and a 1.3 cm subcapsular right hepatic lobe cyst that stable to slightly increased in size. We will review this further at your visit that is coming up. Dec 27 2022 u.s shows pancreas not able to be seen due overlying bowel gas. Liver was normal in size and smooth in contour and mildly echogenic but without significant change versus May 2022 u.s. They felt it represented a mild degree of fatty infiltration. They stated also that it remained mildly heterogeneous and without significant change. A 9.7 x 7.8 x 10.1 mm cyst was seen in the peripheral and posterior aspect of segment 7 and it was slightly larger but otherwise stable. No gallstones were seen in the gallbladder. No bile duct dilation was seen. Common hepatic bile duct was normal at 3.6 mm. They could not see the more distal common bile duct due to gas as well. Right kidney was 10.6 cm. Spleen was 9.4 cm. Liver vessels were patent as expected. Overall they felt the liver still had mild fatty changes and the tiny cyst was slightly larger but was otherwise stable. No gallstones were seen. The spleen was normal sized at 9.4 cm. Given these findings and ecxam limits and the AFP remaining elevated at 10.3, we would recommend that you redo the ultrasound and labs again in the timeframe of April. We can also change your telehealth appointment to that timeframe. We will ask Eileen at [phone] ext [phone] to reach out to you to set this up. December 16th 2022 labs are back. The complete blood count including the hemoglobin MCV, and platelets are normal. The complete metabolic panel shows the creatinine is 1.14, sodium 144, potassium 4.3, bilirubin 0.8, alkaline phosphatase 55, AST 23, ALT 14 previously and in April the AST was 30 and the ALT was 17 so these are better. The hepatitis B virus level is 121 so less than 2000. Surface antigen is reactive. The AFP was 10.3 previously in April it was 10.9. We will see what the ultrasound shows us and as discussed we may need more advanced imaging. My office staff was able to get the May 27 A[facility] report sent in. There was some motion limitations on the scan. Lower chest visualized area showed no significant hilar effusion at the lung bases. The liver was within normal limits for size with the right lobe measuring 16.5 cm. No significant liver fat was seen. There are a few small T2 hyperintense and T1 hypointense hepatic cyst seen measuring 1.4 cm in the posterior right lobe and 0.8 cm near the gallbladder fossa. Several other scattered additional subcentimeter T2 hyperintense foci likely hepatic cysts and/or biliary hamartomas were seen throughout the liver. No suspicious enhancing liver lesions were seen. Liver fat was normal at 5%. The gallbladder was unremarkable. No intra or extrahepatic bile duct dilation was seen. Spleen was normal with regards to size. Adrenal glands were unremarkable. Pancreas was unremarkable. Kidneys showed no hydronephrosis but there is a 1.5 cm cyst seen in the left kidney. Liver vessels were patent. They did see scattered colon diverticuli in the visualized colon but no signs of diverticulitis. Overall they felt that you had no significant liver fat but they did see multiple small hepatic cysts and/or biliary hamartomas. No suspicious liver lesions were seen. May 3 ultrasound shows the right upper quadrant study to show no gallstones in the gallbladder. Common bile duct normal at 4 mm. Liver however appeared to be coarsened in regards to the echotexture to them. A 9 mm right lobe liver cyst was seen. The pancreas was not well seen due to gas. The right kidney measured 11.1 cm. Spleen measured 10.1 cm. Liver vessels were patent. They were concerned about the liver coarsening as to possible underlying fibrosis and/or cirrhosis risk. Typically we would recommend to do an MRI to assess this further. I looked back at notes regarding prior studies for you and when I looked at the last ultrasound in 2017 it did not mention any coarsening but it did mention the 1.5 x 1.1 x 1.2 cm cyst of the liver. The last October 2017 MRI mentioned a cyst but supposedly the liver was otherwise unremarkable. As you recall from your recent lab review hep B DNA remains low at 40 and the AST 30 and ALT of 17 and the platelet count normal. I tried to call you to discuss the results but could not get you. I will send this via portal to you and if you agree to do the mri we will order it to be done now to be sure. We look forward to hearing back from you. April 25 labs show hepatitis A immunity is not seen and you should consider getting the hepatitis A vaccine series. It is a series of 2 doses and can prevent you against getting hepatitis A on top of your current chronic hepatitis issues which would have more risk to cause issues in you versus if you did not have the chronic hepatitis. AFP remains elevated slightly at 10.9 normal being less than 8.4. Hepatitis B E antigen is negative hepatitis B E antibody is positive. This puts you in a lower replicative state. Very common to see at your age. Hepatitis B DNA is low at 40. By itself not meeting criteria for treatment as hbv B dna is less than 2000. Glucose 93 BUN of 14 creatinine 1.10 sodium 139 potassium 4.3 calcium 9.4 albumin 4.8 bilirubin 0.8 alkaline phosphatase 55 AST 30 and ALT 17. Ideal ALT is less than 35. White blood cell count 6.0 hemoglobin 13.9 platelet count 284 MCV 87 and neutrophils 3.2 lymphocytes 2.1. I would normally recommend with these labs to do the ultrasound imaging and blood work every 4 months and to do a more advanced scan if the ultrasound has any issues noted. I am still awaiting the ultrasound imaging. Records review: Patient previously had seen Dr. Baez here back in 2016. Not much of that info. Recently had been seen by Dr. Ozzie Scott of digestive [facility]. Please see a note here from November 6, 2018 that mentions that the patient has chronic hep B and postprandial belching and bloating. Ultrasound ordered for liver screening was not done. EGD and colon was also not done. Patient apparently had issues getting the ultrasound done at [facility]. He was reported to have low viral load and liver enzymes that were negative and E antibody positive state. They stressed the importance of screening. Same 165 still. Weight at that time was 165 pounds height of 5 foot 10 BMI 23.68. December 7, 2016 labs show white blood cell count 7.5 hemoglobin 14.2 glucose 104 BUN of 16 creatinine 1.18 albumin 4.5 AST 26 ALT 15 alkaline phosphatase 55 bilirubin 0.6. July 22, 2015 colonoscopy showed 4 polyps in the right colon ranging from 3 to 5 mm removed with hot snare and 2 polyps in the transverse colon ranging from 2 to 3 mm removed with cold snare and sigmoid diverticulosis and internal hemorrhoids. Last colon 2019 and only one polyp then. Pathology from the right colon polypectomy show fragments of tubular adenoma and fragments of hyperplastic polyp and submucosal lipoma transverse colon polyp was hyperplastic and tubular adenoma. May 2016 B surface antigen positive E antibody positive viral load 8 await. January 2017 labs showed AFP elevated 15.3 viral load 480 AST 31. March 2017 ultrasound mentions pancreas poorly seen no definite abnormality liver was remarkable for a mildly complicated 1.5 x 1.1 x 1.2 cm cyst in the inferior aspect and no gallstones. October 2017 MRI showed small cyst in the posterior segment the right liver otherwise unremarkable. They recommended the patient to have tumor screening and that he could potentially do alternating ultrasound with MRI. They wanted to see him in 6 months. He has not done any more since then. Pt says he went to donate blood and he is from [place]. He says had letter then. He saw specialist then and he was 17 and told not to worry. He was seeing Dr Baez and Dr Scott for gi issues and then mentioned this and led to the work up. Do not see any notes from that timeframe but do see notes from earlier in April 2018 where again he was being seen for the hepatitis B and labs from September 2017 showed AST 27 ALT 16 total bili 0.8 in May 2016 labs reference viral load 8 await. December 2016 labs show white count 7.5 hemoglobin 14.2 creatinine 1.0 AST 31 ALT 21 Past medical history chronic hep B, colon polyps, back pain. Past surgeries include colonoscopy 2015, nasal septal surgery, fractured left wrist. Social history not a smoker but occasional social alcohol. Father with prostate cancer and kidney stones. Mother with thyroid disease. We did see a hep B panel (from November 6, 2018 a level of 300 hep B April 2018 for 50 September 2017 230 ALT over the same timeframe have been 16, 15, and 16. We did confirm that January 9, 2017 AFP that was elevated at 15.3 and normal was less than 8.3. We saw an EGD / colon from October 2019 showed a small hiatal hernia mild gastritis and a 5 mm ascending colon polyp and diverticulosis. Duodenum showed no significant histologic changes stomach showed no H. pylori and ascending colon showed vegetable matter. Need to caught on labs and u.s. Plan 1. Talked with pt re the hbv dna needs to be watched and afp remains up and follow the course. 2. Pt will do u.s again in March 2024 3. DIscussed possible hep b tx for the inc afp but he declines to consider. 4. Pt will see us in 4m.  Duration of the visit was  minutes with 10 minutes of chart prep and 25 minutes for this telemed healow visit today with time reviewing their prior and recent records and labs and discussing their current status and future plans for care for the patient.

## 2024-09-26 ENCOUNTER — DASHBOARD ENCOUNTERS (OUTPATIENT)
Age: 64
End: 2024-09-26

## 2024-10-01 ENCOUNTER — TELEPHONE ENCOUNTER (OUTPATIENT)
Dept: URBAN - METROPOLITAN AREA CLINIC 86 | Facility: CLINIC | Age: 64
End: 2024-10-01

## 2024-10-01 NOTE — HPI-TODAY'S VISIT:
Mleina White,  September 27 labs showed AFP still up at 9.5 but down from 9.7 in December of last year.  Normal is less than 6.1.  Hep B DNA still detected at 74 but down from 504 in December.  Glucose 86 BUN 14 creat 0.95 sodium 140 blood 4.2 calcium 8.3 albumin 4.3 bilirubin 0.7 alk phos 46 AST 21 ALT 14 previous AST 22 ALT 16 CBC obstruction little lower.  WBC 6.2 hemoglobin 14.1 platelet count 245 MCV 89.6 with normal neutrophils and lymphocytes.  Dr Sen

## 2024-10-02 ENCOUNTER — OFFICE VISIT (OUTPATIENT)
Dept: URBAN - METROPOLITAN AREA TELEHEALTH 2 | Facility: TELEHEALTH | Age: 64
End: 2024-10-02
Payer: COMMERCIAL

## 2024-10-02 ENCOUNTER — LAB OUTSIDE AN ENCOUNTER (OUTPATIENT)
Dept: URBAN - METROPOLITAN AREA TELEHEALTH 2 | Facility: TELEHEALTH | Age: 64
End: 2024-10-02

## 2024-10-02 VITALS — BODY MASS INDEX: 22.12 KG/M2 | WEIGHT: 158 LBS | HEIGHT: 71 IN

## 2024-10-02 DIAGNOSIS — B18.1 CHRONIC HEPATITIS B: ICD-10-CM

## 2024-10-02 DIAGNOSIS — R77.2 ELEVATED AFP: ICD-10-CM

## 2024-10-02 DIAGNOSIS — K76.89 LIVER CYST: ICD-10-CM

## 2024-10-02 DIAGNOSIS — K63.5 COLON POLYP: ICD-10-CM

## 2024-10-02 DIAGNOSIS — R89.8 ELEVATED TUMOR MARKERS: ICD-10-CM

## 2024-10-02 DIAGNOSIS — K44.9 HIATAL HERNIA: ICD-10-CM

## 2024-10-02 PROCEDURE — 99214 OFFICE O/P EST MOD 30 MIN: CPT | Performed by: PHYSICIAN ASSISTANT

## 2024-10-02 NOTE — HPI-TODAY'S VISIT:
Patient is a 64-year-old male last seen jan 2024 and referred for evaluation of hepatitis B issues.  A copy of the note will be sent to the referring provider.  10/2/24 September 27 labs showed AFP still up at 9.5 but down from 9.7 in December of last year.  Normal is less than 6.1.  Hep B DNA still detected at 74 but down from 504 in December.  Glucose 86 BUN 14 creat 0.95 sodium 140 blood 4.2 calcium 8.3 albumin 4.3 bilirubin 0.7 alk phos 46 AST 21 ALT 14 previous AST 22 ALT 16   WBC 6.2 hemoglobin 14.1 platelet count 245 MCV 89.6 with normal neutrophils and lymphocytes.     April 1 ultrasound back. Liver, gallbladder, common bile duct, observed pancreas portions, spleen, aorta and vena cava were within normal limits. The did mention that you had a 15 mm posterior right hepatic cyst present. Aorta was 2.2 cm proximally, 1.5 cm in the mid aspect and 1.4 cm distally. Right kidney was 12 cm and left kidney 12 cm with the left kidney having a 1.7 cm cyst. In summary, they felt there was no suspicious mass seen. December 5 ultrasound previously mentioned that you had a small hepatic cyst which was also seen. Left kidney had a 1.9 cm cyst by measurement last time so this was within the variability that he can sometimes se important for you to do imaging every 4 months if the AFP remains elevated and that can be slowed down to every 6 months if it normalizes. Awaiting the labs. Dr. Sen Dec 28 labs show HBV dna 504 up from 277 and prior 121 so staying less than 2000. Afp elevated though 9.7 from 9.5 and prior 10.3. Glucose 87 and bun 16 and cr 1.08 and na 144 and k 4.5 and cl 103 and co2 31 and ca 9.8 and total protein 7.0. Total bili 0.8 and alk 56 and ast 22 and alt 16 and prior ast 21 and alt 13. So staying ideal alt less than 35. Wbc 8.2 and hg 14.4 and hct 42.7 and platelets 273. Mcv 87.9 normal. Normal neutrophils and lymphocytes. Lets discuss again the pros and cons of trying to control the hep b with meds and see if the afp normalizes with that at the visit next week. While not a usual indication, when done, I have seen it help some in this circumstance before. Dec 5 u.s sent to me today. Liver had a normal echotexture, contour and size. The previously seen small hepatic cyst from your MRI of August 17, 2023 were not able to be identified on the ultrasound. Patent vessels were seen. Normal gallbladder was seen with a nondilated common bile duct measuring 2 mm noted. A 6 mm simple cyst seen in the interpolar right kidney and a 1.9 cm simple cyst seen in the interpolar left kidney. Spleen was normal overall. Normal imaged portions of the pancreas were noted. In summary, no significant abnormality of the imaged inferior vena cava or aorta was seen. We will review this further with you at your upcoming visit of January 3. Aug 16 afp 9.5 and 10.3 and 10.9 prior and good to see this dropping. HBV dna 277 and up from prior 121 and prior 40. Trend still less than 2000. Sugar 125 elevated and maybe not fasting. Bun 18 and cr 1.05 and na 140 and k 3.9 and cl 103 and co2 26 and ca 9.5 and alb 4.3 and tb 0.9 and alk 52 and ast 21 and alt 13. Prior ast 23 and alt 14. Wbc 7.2 and hg 14.4 and hct 44.5 and mcv 88.8 and platelets 259. Hepatitis B surface antigen positive as expected. Aug 17 ahi mri: heart was wnl. No sig effusion. Liver was wnl in size and right lobe 16.5cm and no significant fat. Small cysts stable. Gave the size 1.4cm stab.e. smaller cysts and or hamartomas. No suspicious mass. Liver fat 2 and down from 5%. NO gallstones. Spleen normal. No hydronephrosis and a few cysts in kidneys. Vessels open. Diverticula off the colon. April 11th ultrasound shows liver to be normal in size/contour with top normal echogenicity. They did see a 1.2 x 1.3 cm simple subcapsular right hepatic lobe cyst that is stable to minimally increased in size. No suspicious liver lesions seen. Gallbladder unremarkable with no stones. Common bile duct normal at 2.8 mm. Imaged pancreas portions unremarkable with tail partially obscured by gas. Right kidney 10.9 cm with no hydronephrosis. Spleen measures up to 11.2 cm with no suspicious lesions. Liver and splenic vessels patent. In summary no evidence of any overt cirrhotic morphology or suspicious liver lesions and a 1.3 cm subcapsular right hepatic lobe cyst that stable to slightly increased in size. We will review this further at your visit that is coming up. Dec 27 2022 u.s shows pancreas not able to be seen due overlying bowel gas. Liver was normal in size and smooth in contour and mildly echogenic but without significant change versus May 2022 u.s. They felt it represented a mild degree of fatty infiltration. They stated also that it remained mildly heterogeneous and without significant change. A 9.7 x 7.8 x 10.1 mm cyst was seen in the peripheral and posterior aspect of segment 7 and it was slightly larger but otherwise stable. No gallstones were seen in the gallbladder. No bile duct dilation was seen. Common hepatic bile duct was normal at 3.6 mm. They could not see the more distal common bile duct due to gas as well. Right kidney was 10.6 cm. Spleen was 9.4 cm. Liver vessels were patent as expected. Overall they felt the liver still had mild fatty changes and the tiny cyst was slightly larger but was otherwise stable. No gallstones were seen. The spleen was normal sized at 9.4 cm. Given these findings and ecxam limits and the AFP remaining elevated at 10.3, we would recommend that you redo the ultrasound and labs again in the timeframe of April. We can also change your telehealth appointment to that timeframe. We will ask Eileen at [phone] ext [phone] to reach out to you to set this up. December 16th 2022 labs are back. The complete blood count including the hemoglobin MCV, and platelets are normal. The complete metabolic panel shows the creatinine is 1.14, sodium 144, potassium 4.3, bilirubin 0.8, alkaline phosphatase 55, AST 23, ALT 14 previously and in April the AST was 30 and the ALT was 17 so these are better. The hepatitis B virus level is 121 so less than 2000. Surface antigen is reactive. The AFP was 10.3 previously in April it was 10.9. We will see what the ultrasound shows us and as discussed we may need more advanced imaging. My office staff was able to get the May 27 [facility] report sent in. There was some motion limitations on the scan. Lower chest visualized area showed no significant hilar effusion at the lung bases. The liver was within normal limits for size with the right lobe measuring 16.5 cm. No significant liver fat was seen. There are a few small T2 hyperintense and T1 hypointense hepatic cyst seen measuring 1.4 cm in the posterior right lobe and 0.8 cm near the gallbladder fossa. Several other scattered additional subcentimeter T2 hyperintense foci likely hepatic cysts and/or biliary hamartomas were seen throughout the liver. No suspicious enhancing liver lesions were seen. Liver fat was normal at 5%. The gallbladder was unremarkable. No intra or extrahepatic bile duct dilation was seen. Spleen was normal with regards to size. Adrenal glands were unremarkable. Pancreas was unremarkable. Kidneys showed no hydronephrosis but there is a 1.5 cm cyst seen in the left kidney. Liver vessels were patent. They did see scattered colon diverticuli in the visualized colon but no signs of diverticulitis. Overall they felt that you had no significant liver fat but they did see multiple small hepatic cysts and/or biliary hamartomas. No suspicious liver lesions were seen. May 3 ultrasound shows the right upper quadrant study to show no gallstones in the gallbladder. Common bile duct normal at 4 mm. Liver however appeared to be coarsened in regards to the echotexture to them. A 9 mm right lobe liver cyst was seen. The pancreas was not well seen due to gas. The right kidney measured 11.1 cm. Spleen measured 10.1 cm. Liver vessels were patent. They were concerned about the liver coarsening as to possible underlying fibrosis and/or cirrhosis risk. Typically we would recommend to do an MRI to assess this further. I looked back at notes regarding prior studies for you and when I looked at the last ultrasound in 2017 it did not mention any coarsening but it did mention the 1.5 x 1.1 x 1.2 cm cyst of the liver. The last October 2017 MRI mentioned a cyst but supposedly the liver was otherwise unremarkable. As you recall from your recent lab review hep B DNA remains low at 40 and the AST 30 and ALT of 17 and the platelet count normal. I tried to call you to discuss the results but could not get you. I will send this via portal to you and if you agree to do the mri we will order it to be done now to be sure. We look forward to hearing back from you. April 25 labs show hepatitis A immunity is not seen and you should consider getting the hepatitis A vaccine series. It is a series of 2 doses and can prevent you against getting hepatitis A on top of your current chronic hepatitis issues which would have more risk to cause issues in you versus if you did not have the chronic hepatitis. AFP remains elevated slightly at 10.9 normal being less than 8.4. Hepatitis B E antigen is negative hepatitis B E antibody is positive. This puts you in a lower replicative state. Very common to see at your age. Hepatitis B DNA is low at 40. By itself not meeting criteria for treatment as hbv B dna is less than 2000. Glucose 93 BUN of 14 creatinine 1.10 sodium 139 potassium 4.3 calcium 9.4 albumin 4.8 bilirubin 0.8 alkaline phosphatase 55 AST 30 and ALT 17. Ideal ALT is less than 35. White blood cell count 6.0 hemoglobin 13.9 platelet count 284 MCV 87 and neutrophils 3.2 lymphocytes 2.1. I would normally recommend with these labs to do the ultrasound imaging and blood work every 4 months and to do a more advanced scan if the ultrasound has any issues noted. I am still awaiting the ultrasound imaging. Records review: Patient previously had seen Dr. Baez here back in 2016. Not much of that info. Recently had been seen by Dr. Ozzie Scott of digestive [facility]. Please see a note here from November 6, 2018 that mentions that the patient has chronic hep B and postprandial belching and bloating. Ultrasound ordered for liver screening was not done. EGD and colon was also not done. Patient apparently had issues getting the ultrasound done at [facility]. He was reported to have low viral load and liver enzymes that were negative and E antibody positive state. They stressed the importance of screening. Same 165 still. Weight at that time was 165 pounds height of 5 foot 10 BMI 23.68. December 7, 2016 labs show white blood cell count 7.5 hemoglobin 14.2 glucose 104 BUN of 16 creatinine 1.18 albumin 4.5 AST 26 ALT 15 alkaline phosphatase 55 bilirubin 0.6. July 22, 2015 colonoscopy showed 4 polyps in the right colon ranging from 3 to 5 mm removed with hot snare and 2 polyps in the transverse colon ranging from 2 to 3 mm removed with cold snare and sigmoid diverticulosis and internal hemorrhoids. Last colon 2019 and only one polyp then. Pathology from the right colon polypectomy show fragments of tubular adenoma and fragments of hyperplastic polyp and submucosal lipoma transverse colon polyp was hyperplastic and tubular adenoma. May 2016 B surface antigen positive E antibody positive viral load 8 await. January 2017 labs showed AFP elevated 15.3 viral load 480 AST 31. March 2017 ultrasound mentions pancreas poorly seen no definite abnormality liver was remarkable for a mildly complicated 1.5 x 1.1 x 1.2 cm cyst in the inferior aspect and no gallstones. October 2017 MRI showed small cyst in the posterior segment the right liver otherwise unremarkable. They recommended the patient to have tumor screening and that he could potentially do alternating ultrasound with MRI. They wanted to see him in 6 months. He has not done any more since then. Pt says he went to donate blood and he is from [place]. He says had letter then. He saw specialist then and he was 17 and told not to worry. He was seeing Dr Baez and Dr Scott for gi issues and then mentioned this and led to the work up. Do not see any notes from that timeframe but do see notes from earlier in April 2018 where again he was being seen for the hepatitis B and labs from September 2017 showed AST 27 ALT 16 total bili 0.8 in May 2016 labs reference viral load 8 await. December 2016 labs show white count 7.5 hemoglobin 14.2 creatinine 1.0 AST 31 ALT 21 Past medical history chronic hep B, colon polyps, back pain. Past surgeries include colonoscopy 2015, nasal septal surgery, fractured left wrist. Social history not a smoker but occasional social alcohol. Father with prostate cancer and kidney stones. Mother with thyroid disease. We did see a hep B panel (from November 6, 2018 a level of 300 hep B April 2018 for 50 September 2017 230 ALT over the same timeframe have been 16, 15, and 16. We did confirm that January 9, 2017 AFP that was elevated at 15.3 and normal was less than 8.3. We saw an EGD / colon from October 2019 showed a small hiatal hernia mild gastritis and a 5 mm ascending colon polyp and diverticulosis. Duodenum showed no significant histologic changes stomach showed no H. pylori and ascending colon showed vegetable matter. Need to caught on labs and u.s.

## 2024-10-17 ENCOUNTER — TELEPHONE ENCOUNTER (OUTPATIENT)
Dept: URBAN - METROPOLITAN AREA CLINIC 86 | Facility: CLINIC | Age: 64
End: 2024-10-17

## 2024-10-17 NOTE — HPI-TODAY'S VISIT:
Dear Johan White, I MRI was sent to me.  The liver appeared normal in size and they did not see any significant fat.  They saw small cyst in the right lobe measuring 1.4 cm.  They saw a few other areas that were likely cyst as well that appeared normal.  No suspicious lesions.  Fat quantification 4% and previously 2%.  Still not considered fatty.  Gallbladder appeared normal.  Spleen normal.  Pancreas was also normal.  Kidneys overall appear normal they did note a renal cyst on the left that was stable.  They did not see any enlarged lymph nodes.  Hepatic vasculature is patent.  Overall they saw the cyst that were stable.  We will review this at your visit and see how you are doing. Paulette Florez PA-C

## 2025-02-02 ENCOUNTER — LAB OUTSIDE AN ENCOUNTER (OUTPATIENT)
Dept: URBAN - METROPOLITAN AREA TELEHEALTH 2 | Facility: TELEHEALTH | Age: 65
End: 2025-02-02

## 2025-02-21 NOTE — PHYSICAL EXAM NEUROLOGIC:
Stat ECG was performed on 02/21/25 at 1345 ECG was given to Vanessa CARRION at 1342   oriented to person, place and time

## 2025-03-01 ENCOUNTER — LAB OUTSIDE AN ENCOUNTER (OUTPATIENT)
Dept: URBAN - METROPOLITAN AREA CLINIC 92 | Facility: CLINIC | Age: 65
End: 2025-03-01

## 2025-03-04 ENCOUNTER — OFFICE VISIT (OUTPATIENT)
Dept: URBAN - METROPOLITAN AREA TELEHEALTH 2 | Facility: TELEHEALTH | Age: 65
End: 2025-03-04
Payer: COMMERCIAL

## 2025-03-04 VITALS — BODY MASS INDEX: 22.26 KG/M2 | WEIGHT: 159 LBS | HEIGHT: 71 IN

## 2025-03-04 DIAGNOSIS — K76.89 LIVER CYST: ICD-10-CM

## 2025-03-04 DIAGNOSIS — B18.1 CHRONIC HEPATITIS B: ICD-10-CM

## 2025-03-04 DIAGNOSIS — R89.8 ELEVATED TUMOR MARKERS: ICD-10-CM

## 2025-03-04 DIAGNOSIS — R77.2 ELEVATED AFP: ICD-10-CM

## 2025-03-04 DIAGNOSIS — K63.5 COLON POLYP: ICD-10-CM

## 2025-03-04 DIAGNOSIS — K44.9 HIATAL HERNIA: ICD-10-CM

## 2025-03-04 LAB
A/G RATIO: 1.6
ABSOLUTE BASOPHILS: 51
ABSOLUTE EOSINOPHILS: 211
ABSOLUTE LYMPHOCYTES: 2598
ABSOLUTE MONOCYTES: 531
ABSOLUTE NEUTROPHILS: 3008
AFP, SERUM, TUMOR MARKER: 8.5
ALBUMIN: 4.4
ALKALINE PHOSPHATASE: 47
ALT (SGPT): 14
AST (SGOT): 24
BASOPHILS: 0.8
BILIRUBIN, TOTAL: 1
BUN/CREATININE RATIO: (no result)
BUN: 14
CALCIUM: 9.3
CARBON DIOXIDE, TOTAL: 29
CHLORIDE: 103
COMMENT: (no result)
CREATININE: 1.04
EGFR: 81
EOSINOPHILS: 3.3
GLOBULIN, TOTAL: 2.7
GLUCOSE: 87
HEMATOCRIT: 43
HEMOGLOBIN: 14.3
HEPATITIS B SURFACE ANTIGEN: REACTIVE
HEPATITIS B VIRUS DNA: 2.37
HEPATITIS B VIRUS DNA: 236
LYMPHOCYTES: 40.6
MCH: 29.7
MCHC: 33.3
MCV: 89.2
MONOCYTES: 8.3
MPV: 9.4
NEUTROPHILS: 47
PLATELET COUNT: 261
POTASSIUM: 4.5
PROTEIN, TOTAL: 7.1
RDW: 12.3
RED BLOOD CELL COUNT: 4.82
SODIUM: 141
WHITE BLOOD CELL COUNT: 6.4

## 2025-03-04 PROCEDURE — 99214 OFFICE O/P EST MOD 30 MIN: CPT

## 2025-03-04 NOTE — HPI-TODAY'S VISIT:
Patient is a 64-year-old male last seen jan 2024 and referred for evaluation of hepatitis B issues.  A copy of the note will be sent to the referring provider.  He did labs saturday follow.   No u.s wass done and he wants to do mri.  Oct 2024 MRI was sent to [facility]. The liver appeared normal in size and they did not see any significant fat. They saw small cyst in the right lobe measuring 1.4 cm. They saw a few other areas that were likely cyst as well that appeared normal. No suspicious lesions. Fat quantification 4% and previously 2%. Still not considered fatty. Gallbladder appeared normal. Spleen normal. Pancreas was also normal. Kidneys overall appear normal they did note a renal cyst on the left that was stable. They did not see any enlarged lymph nodes. Hepatic vasculature is patent. Overall they saw the cyst that were stable. We will review this at your visit and see how you are doing.  He did that ahi in OCT 2024 and wants to redo in Oct 2025.  10/2/24 September 27 labs showed AFP still up at 9.5 but down from 9.7 in December of last year. Normal is less than 6.1. Hep B DNA still detected at 74 but down from 504 in December. Glucose 86 BUN 14 creat 0.95 sodium 140 blood 4.2 calcium 8.3 albumin 4.3 bilirubin 0.7 alk phos 46 AST 21 ALT 14 previous AST 22 ALT 16  WBC 6.2 hemoglobin 14.1 platelet count 245 MCV 89.6 with normal neutrophils and lymphocytes.  April 1 2024 ultrasound back. Liver, gallbladder, common bile duct, observed pancreas portions, spleen, aorta and vena cava were within normal limits. The did mention that you had a 15 mm posterior right hepatic cyst present. Aorta was 2.2 cm proximally, 1.5 cm in the mid aspect and 1.4 cm distally. Right kidney was 12 cm and left kidney 12 cm with the left kidney having a 1.7 cm cyst. In summary, they felt there was no suspicious mass seen.  December 5 2023 ultrasound previously mentioned that you had a small hepatic cyst which was also seen. Left kidney had a 1.9 cm cyst by measurement last time so this was within the variability that he can sometimes se important for you to do imaging every 4 months if the AFP remains elevated and that can be slowed down to every 6 months if it normalizes.   Dec 28 labs show HBV dna 504 up from 277 and prior 121 so staying less than 2000. Afp elevated though 9.7 from 9.5 and prior 10.3. Glucose 87 and bun 16 and cr 1.08 and na 144 and k 4.5 and cl 103 and co2 31 and ca 9.8 and total protein 7.0. Total bili 0.8 and alk 56 and ast 22 and alt 16 and prior ast 21 and alt 13. So staying ideal alt less than 35. Wbc 8.2 and hg 14.4 and hct 42.7 and platelets 273. Mcv 87.9 normal. Normal neutrophils and lymphocytes. Lets discuss again the pros and cons of trying to control the hep b with meds and see if the afp normalizes with that at the visit next week. While not a usual indication, when done, I have seen it help some in this circumstance before.  Dec 5 u.s sent to me today. Liver had a normal echotexture, contour and size. The previously seen small hepatic cyst from your MRI of August 17, 2023 were not able to be identified on the ultrasound. Patent vessels were seen. Normal gallbladder was seen with a nondilated common bile duct measuring 2 mm noted. A 6 mm simple cyst seen in the interpolar right kidney and a 1.9 cm simple cyst seen in the interpolar left kidney. Spleen was normal overall. Normal imaged portions of the pancreas were noted. In summary, no significant abnormality of the imaged inferior vena cava or aorta was seen. We will review this further with you at your upcoming visit of January 3. Aug 16 afp 9.5 and 10.3 and 10.9 prior and good to see this dropping. HBV dna 277 and up from prior 121 and prior 40. Trend still less than 2000. Sugar 125 elevated and maybe not fasting. Bun 18 and cr 1.05 and na 140 and k 3.9 and cl 103 and co2 26 and ca 9.5 and alb 4.3 and tb 0.9 and alk 52 and ast 21 and alt 13. Prior ast 23 and alt 14. Wbc 7.2 and hg 14.4 and hct 44.5 and mcv 88.8 and platelets 259. Hepatitis B surface antigen positive as expected. Aug 17 ahi mri: heart was wnl. No sig effusion. Liver was wnl in size and right lobe 16.5cm and no significant fat. Small cysts stable. Gave the size 1.4cm stab.e. smaller cysts and or hamartomas. No suspicious mass. Liver fat 2 and down from 5%. NO gallstones. Spleen normal. No hydronephrosis and a few cysts in kidneys. Vessels open. Diverticula off the colon.  April 11th ultrasound shows liver to be normal in size/contour with top normal echogenicity. They did see a 1.2 x 1.3 cm simple subcapsular right hepatic lobe cyst that is stable to minimally increased in size. No suspicious liver lesions seen. Gallbladder unremarkable with no stones. Common bile duct normal at 2.8 mm. Imaged pancreas portions unremarkable with tail partially obscured by gas. Right kidney 10.9 cm with no hydronephrosis. Spleen measures up to 11.2 cm with no suspicious lesions. Liver and splenic vessels patent. In summary no evidence of any overt cirrhotic morphology or suspicious liver lesions and a 1.3 cm subcapsular right hepatic lobe cyst that stable to slightly increased in size. We will review this further at your visit that is coming up. Dec 27 2022 u.s shows pancreas not able to be seen due overlying bowel gas. Liver was normal in size and smooth in contour and mildly echogenic but without significant change versus May 2022 u.s. They felt it represented a mild degree of fatty infiltration. They stated also that it remained mildly heterogeneous and without significant change. A 9.7 x 7.8 x 10.1 mm cyst was seen in the peripheral and posterior aspect of segment 7 and it was slightly larger but otherwise stable. No gallstones were seen in the gallbladder. No bile duct dilation was seen. Common hepatic bile duct was normal at 3.6 mm. They could not see the more distal common bile duct due to gas as well. Right kidney was 10.6 cm. Spleen was 9.4 cm. Liver vessels were patent as expected. Overall they felt the liver still had mild fatty changes and the tiny cyst was slightly larger but was otherwise stable. No gallstones were seen. The spleen was normal sized at 9.4 cm. Given these findings and ecxam limits and the AFP remaining elevated at 10.3, we would recommend that you redo the ultrasound and labs again in the timeframe of April. We can also change your telehealth appointment to that timeframe. We will ask Eileen at [phone] ext [phone] to reach out to you to set this up. December 16th 2022 labs are back. The complete blood count including the hemoglobin MCV, and platelets are normal. The complete metabolic panel shows the creatinine is 1.14, sodium 144, potassium 4.3, bilirubin 0.8, alkaline phosphatase 55, AST 23, ALT 14 previously and in April the AST was 30 and the ALT was 17 so these are better. The hepatitis B virus level is 121 so less than 2000. Surface antigen is reactive. The AFP was 10.3 previously in April it was 10.9. We will see what the ultrasound shows us and as discussed we may need more advanced imaging. My office staff was able to get the May 27 AHI report sent in. There was some motion limitations on the scan. Lower chest visualized area showed no significant hilar effusion at the lung bases. The liver was within normal limits for size with the right lobe measuring 16.5 cm. No significant liver fat was seen. There are a few small T2 hyperintense and T1 hypointense hepatic cyst seen measuring 1.4 cm in the posterior right lobe and 0.8 cm near the gallbladder fossa. Several other scattered additional subcentimeter T2 hyperintense foci likely hepatic cysts and/or biliary hamartomas were seen throughout the liver. No suspicious enhancing liver lesions were seen. Liver fat was normal at 5%. The gallbladder was unremarkable. No intra or extrahepatic bile duct dilation was seen. Spleen was normal with regards to size. Adrenal glands were unremarkable. Pancreas was unremarkable. Kidneys showed no hydronephrosis but there is a 1.5 cm cyst seen in the left kidney. Liver vessels were patent. They did see scattered colon diverticuli in the visualized colon but no signs of diverticulitis. Overall they felt that you had no significant liver fat but they did see multiple small hepatic cysts and/or biliary hamartomas. No suspicious liver lesions were seen. May 3 ultrasound shows the right upper quadrant study to show no gallstones in the gallbladder. Common bile duct normal at 4 mm. Liver however appeared to be coarsened in regards to the echotexture to them. A 9 mm right lobe liver cyst was seen. The pancreas was not well seen due to gas. The right kidney measured 11.1 cm. Spleen measured 10.1 cm. Liver vessels were patent. They were concerned about the liver coarsening as to possible underlying fibrosis and/or cirrhosis risk. Typically we would recommend to do an MRI to assess this further. I looked back at notes regarding prior studies for you and when I looked at the last ultrasound in 2017 it did not mention any coarsening but it did mention the 1.5 x 1.1 x 1.2 cm cyst of the liver. The last October 2017 MRI mentioned a cyst but supposedly the liver was otherwise unremarkable. As you recall from your recent lab review hep B DNA remains low at 40 and the AST 30 and ALT of 17 and the platelet count normal. I tried to call you to discuss the results but could not get you. I will send this via portal to you and if you agree to do the mri we will order it to be done now to be sure. We look forward to hearing back from you. April 25 labs show hepatitis A immunity is not seen and you should consider getting the hepatitis A vaccine series. It is a series of 2 doses and can prevent you against getting hepatitis A on top of your current chronic hepatitis issues which would have more risk to cause issues in you versus if you did not have the chronic hepatitis. AFP remains elevated slightly at 10.9 normal being less than 8.4. Hepatitis B E antigen is negative hepatitis B E antibody is positive. This puts you in a lower replicative state. Very common to see at your age. Hepatitis B DNA is low at 40. By itself not meeting criteria for treatment as hbv B dna is less than 2000. Glucose 93 BUN of 14 creatinine 1.10 sodium 139 potassium 4.3 calcium 9.4 albumin 4.8 bilirubin 0.8 alkaline phosphatase 55 AST 30 and ALT 17. Ideal ALT is less than 35. White blood cell count 6.0 hemoglobin 13.9 platelet count 284 MCV 87 and neutrophils 3.2 lymphocytes 2.1. I would normally recommend with these labs to do the ultrasound imaging and blood work every 4 months and to do a more advanced scan if the ultrasound has any issues noted. I am still awaiting the ultrasound imaging. Records review: Patient previously had seen Dr. Baez here back in 2016. Not much of that info. Recently had been seen by Dr. Ozzie Scott of [facility]. Please see a note here from November 6, 2018 that mentions that the patient has chronic hep B and postprandial belching and bloating. Ultrasound ordered for liver screening was not done. EGD and colon was also not done. Patient apparently had issues getting the ultrasound done at [facility]. He was reported to have low viral load and liver enzymes that were negative and E antibody positive state. They stressed the importance of screening. Same 165 still. Weight at that time was 165 pounds height of 5 foot 10 BMI 23.68. December 7, 2016 labs show white blood cell count 7.5 hemoglobin 14.2 glucose 104 BUN of 16 creatinine 1.18 albumin 4.5 AST 26 ALT 15 alkaline phosphatase 55 bilirubin 0.6. July 22, 2015 colonoscopy showed 4 polyps in the right colon ranging from 3 to 5 mm removed with hot snare and 2 polyps in the transverse colon ranging from 2 to 3 mm removed with cold snare and sigmoid diverticulosis and internal hemorrhoids. Last colon 2019 and only one polyp then. Pathology from the right colon polypectomy show fragments of tubular adenoma and fragments of hyperplastic polyp and submucosal lipoma transverse colon polyp was hyperplastic and tubular adenoma. May 2016 B surface antigen positive E antibody positive viral load 8 await. January 2017 labs showed AFP elevated 15.3 viral load 480 AST 31. March 2017 ultrasound mentions pancreas poorly seen no definite abnormality liver was remarkable for a mildly complicated 1.5 x 1.1 x 1.2 cm cyst in the inferior aspect and no gallstones. October 2017 MRI showed small cyst in the posterior segment the right liver otherwise unremarkable. They recommended the patient to have tumor screening and that he could potentially do alternating ultrasound with MRI. They wanted to see him in 6 months. He has not done any more since then. Pt says he went to donate blood and he is from [place]. He says had letter then. He saw specialist then and he was 17 and told not to worry. He was seeing Dr Baez and Dr Scott for gi issues and then mentioned this and led to the work up. Do not see any notes from that timeframe but do see notes from earlier in April 2018 where again he was being seen for the hepatitis B and labs from September 2017 showed AST 27 ALT 16 total bili 0.8 in May 2016 labs reference viral load 8 await. December 2016 labs show white count 7.5 hemoglobin 14.2 creatinine 1.0 AST 31 ALT 21 Past medical history chronic hep B, colon polyps, back pain. Past surgeries include colonoscopy 2015, nasal septal surgery, fractured left wrist. Social history not a smoker but occasional social alcohol. Father with prostate cancer and kidney stones. Mother with thyroid disease. We did see a hep B panel (from November 6, 2018 a level of 300 hep B April 2018 for 50 September 2017 230 ALT over the same timeframe have been 16, 15, and 16. We did confirm that January 9, 2017 AFP that was elevated at 15.3 and normal was less than 8.3. We saw an EGD / colon from October 2019 showed a small hiatal hernia mild gastritis and a 5 mm ascending colon polyp and diverticulosis. Duodenum showed no significant histologic changes stomach showed no H. pylori and ascending colon showed vegetable matter. Need to caught on labs and u.s.  Plan: 1. Pt did labs and pending. 2. Mri wants to do once a year in Oct. 3. See you then then in oct and do labs in sept.  4. Pt will be watched for changes and understands.  Duration of visit was 30 minutes total with 10 minutes spent preparing chart, and loading information into [facility] with additional 20 minutes by clock for this [facility] visit with time spent reviewing patient's chart, notes, labs and discussing treatment plan with pt.

## 2025-03-05 ENCOUNTER — TELEPHONE ENCOUNTER (OUTPATIENT)
Dept: URBAN - METROPOLITAN AREA CLINIC 86 | Facility: CLINIC | Age: 65
End: 2025-03-05

## 2025-03-05 ENCOUNTER — WEB ENCOUNTER (OUTPATIENT)
Dept: URBAN - METROPOLITAN AREA CLINIC 86 | Facility: CLINIC | Age: 65
End: 2025-03-05

## 2025-03-05 NOTE — HPI-TODAY'S VISIT:
Johan White, The recent labs were sent to me.  Date of the labs was March 1, 2025.  The alpha-fetoprotein was 8.5 and previously 9.5.  Happy this is lower.  The creatinine 1.04, sodium 141, potassium 4.5, bilirubin 1.0, alkaline phosphatase 47, AST 24 and ALT 14.  Previously the AST 21 and ALT 14.  Goal is less than 35 for the AST and ALT.  Complete blood count overall shows the white blood cell 6.4, hemoglobin 14.3, MCV 89 and platelets 261 and this is normal.  The hepatitis B surface antigen is reactive.  The hepatitis B DNA was 236 which is less than 2000.  Previously it was 74 so a little bit higher this time and we will continue to monitor this. Paulette Florez PA-C